# Patient Record
Sex: MALE | Race: WHITE | NOT HISPANIC OR LATINO
[De-identification: names, ages, dates, MRNs, and addresses within clinical notes are randomized per-mention and may not be internally consistent; named-entity substitution may affect disease eponyms.]

---

## 2021-08-12 ENCOUNTER — RESULT REVIEW (OUTPATIENT)
Age: 46
End: 2021-08-12

## 2021-09-07 ENCOUNTER — RESULT REVIEW (OUTPATIENT)
Age: 46
End: 2021-09-07

## 2021-09-09 PROBLEM — Z00.00 ENCOUNTER FOR PREVENTIVE HEALTH EXAMINATION: Status: ACTIVE | Noted: 2021-09-09

## 2021-09-10 ENCOUNTER — APPOINTMENT (OUTPATIENT)
Dept: PODIATRY | Facility: CLINIC | Age: 46
End: 2021-09-10
Payer: COMMERCIAL

## 2021-09-10 ENCOUNTER — NON-APPOINTMENT (OUTPATIENT)
Age: 46
End: 2021-09-10

## 2021-09-10 VITALS — WEIGHT: 185 LBS | HEIGHT: 71 IN | BODY MASS INDEX: 25.9 KG/M2

## 2021-09-10 DIAGNOSIS — Z78.9 OTHER SPECIFIED HEALTH STATUS: ICD-10-CM

## 2021-09-10 DIAGNOSIS — F17.200 NICOTINE DEPENDENCE, UNSPECIFIED, UNCOMPLICATED: ICD-10-CM

## 2021-09-10 DIAGNOSIS — L97.524 NON-PRESSURE CHRONIC ULCER OF OTHER PART OF LEFT FOOT WITH NECROSIS OF BONE: ICD-10-CM

## 2021-09-10 PROCEDURE — 20245 BONE BIOPSY OPEN DEEP: CPT

## 2021-09-10 PROCEDURE — 99214 OFFICE O/P EST MOD 30 MIN: CPT | Mod: 25

## 2021-09-10 NOTE — REVIEW OF SYSTEMS
[As Noted in HPI] : as noted in HPI [Fever] : no fever [Chills] : no chills [Leg Claudication] : no intermittent leg claudication [Lower Ext Edema] : no extremity edema [Limb Weakness] : no limb weakness [Difficulty Walking] : no difficulty walking

## 2021-09-10 NOTE — HISTORY OF PRESENT ILLNESS
[FreeTextEntry1] : Location-DFU sub 5th left foot\par Duration-many mos, complete hx from [employer], currently on topicals, still hasn't gotten offloading shoes or inneresoles\par \par

## 2021-09-10 NOTE — PROCEDURE
[FreeTextEntry1] : The patient was brought into the treatment room, a time out was performed, surgical consent signed, the surgical foot signed the surgical consent witnessed. Next, the left foot was prepped and draped in the usual sterile fashion. Next approximately 6 cc of 1% lidocaine with epinephrine one 200,000 was injected around the fifth metatarsal base for local anesthesia. Next utilizing a 3 mm punch biopsy it was introduced to the plantar aspect of the left foot just lateral to the existing ulcer bed through the subcutaneous tissue into the plantar aspect of the fifth metatarsal bone. Next utilizing the punch biopsy a small portion of bone was then obtained and removed. It was then placed in a sterile biopsy container and transported to the lab. The wound was irrigated with saline and a dry sterile bandage with Bactroban was applied. The patient was then placed in a surgical shoe with offloading dispersion pad and postop instructions and aftercare. He was given a followup appointment with infectious disease as well as myself to be followed at the wound healing [place]. He left the office in stable and satisfactory condition tolerating the procedure and anesthesia well.\par A lengthy discussion with the patient at this time regarding their current diagnosis, surgical status and prognosis. I advised the patient that if they continue to follow my postoperative instructions regarding limited progressive weight bearing and activity level as well as continued formal physical therapy as well as physical therapy at home coupled with continued ice, elevation and offloading the surgical site they will continue to progress as expected. If, however, the patient does not follow my instructions and exhibits medical non compliance they run the risk of a prolonged post op period to return to normal function re: shoegear and activity level as well relief of symptoms and surgical results will be less than expected.\par I have reviewed the care orders with the patient they understand and they also understands the long-term consequences of not following my wound care orders as well  precautions I have recommended  and shoe gear advice I have mentioned.\par greater than 30 minutes spent with patient\par

## 2021-09-10 NOTE — PHYSICAL EXAM
[General Appearance - Alert] : alert [General Appearance - In No Acute Distress] : in no acute distress [Pes Cavus] : pes cavus deformity [FreeTextEntry3] : arterial study done and no evidence of PAD, pedal pulses palpable [FreeTextEntry1] : Ulcer (dimensions unchanged from tissue analytics ([facility] 9/9/2021)\par patient here for bx sub 5 left

## 2021-09-15 LAB — CORE LAB BIOPSY: NORMAL

## 2021-09-23 ENCOUNTER — RESULT REVIEW (OUTPATIENT)
Age: 46
End: 2021-09-23

## 2021-09-30 ENCOUNTER — RESULT REVIEW (OUTPATIENT)
Age: 46
End: 2021-09-30

## 2021-10-12 ENCOUNTER — RESULT REVIEW (OUTPATIENT)
Age: 46
End: 2021-10-12

## 2021-10-13 ENCOUNTER — RESULT REVIEW (OUTPATIENT)
Age: 46
End: 2021-10-13

## 2021-10-17 ENCOUNTER — RESULT REVIEW (OUTPATIENT)
Age: 46
End: 2021-10-17

## 2021-12-29 ENCOUNTER — APPOINTMENT (OUTPATIENT)
Dept: PODIATRY | Facility: CLINIC | Age: 46
End: 2021-12-29
Payer: SELF-PAY

## 2021-12-29 ENCOUNTER — APPOINTMENT (OUTPATIENT)
Dept: PODIATRY | Facility: CLINIC | Age: 46
End: 2021-12-29
Payer: COMMERCIAL

## 2021-12-29 VITALS — WEIGHT: 185 LBS | BODY MASS INDEX: 25.9 KG/M2 | HEIGHT: 71 IN

## 2021-12-29 PROCEDURE — 99024 POSTOP FOLLOW-UP VISIT: CPT

## 2021-12-29 PROCEDURE — L3000: CPT

## 2021-12-29 NOTE — HISTORY OF PRESENT ILLNESS
[FreeTextEntry1] : patient is s/p left foot surgery. doing well and no complaints. denies fever, chills, N and V , calf pain and SOB\par s/p 5th ray resection left foot\par \par here to be casted for stump filler as well as for chronic great toe ulcer right GT

## 2021-12-29 NOTE — PHYSICAL EXAM
[General Appearance - Alert] : alert [General Appearance - In No Acute Distress] : in no acute distress [Vibration Dec.] : diminished vibratory sensation at the level of the toes [Position Sense Dec.] : diminished position sense at the level of the toes [Diminished Throughout Right Foot] : diminished sensation with monofilament testing throughout right foot [Diminished Throughout Left Foot] : diminished sensation with monofilament testing throughout left foot [FreeTextEntry3] : Vascular exam reveals palpable pedal pulses, the foot is warm to touch, there was good capillary fill time, the skin is normal in appearance there is no evidence of vascular disease or compromise at this time [de-identified] : s/p 5th ray resection [FreeTextEntry1] : chronic ulcer sub right GT-now limmited to HUSAM stage 1

## 2021-12-29 NOTE — PROCEDURE
[FreeTextEntry1] : During the evaluation and management I had a lengthy discussion with the patient regarding benefits of functional foot orthoses. I explained to the patient the etiology and treatment options and one of them included the offloading and balancing of the painful portion of the foot. I explained the importance of balancing in offloading the painful area as part of the overall treatment process to advance healing. I have asked the patient to consider this as part of the treatment\par \par After a lengthy discussion with the patient regarding the possible benefits of orthotics and what we hope to achieve with them as it relates to their diagnosis the patient has agreed to be casted for the devices, The patient was then casted for a pair of custom functional foot orthoses with the subtalar joint in neutral, the forefoot locked, The patient was advised that they will be notified when the orthotics are returned from the laboratory, Should there be any questions or concerns they were advised to contact the office immediately, Educational literature regarding orthotics were dispensed, Included in the casting for orthotics was an overall gait exam and biomechanical evaluation\par

## 2022-01-24 ENCOUNTER — APPOINTMENT (OUTPATIENT)
Dept: PODIATRY | Facility: CLINIC | Age: 47
End: 2022-01-24
Payer: COMMERCIAL

## 2022-01-24 VITALS — BODY MASS INDEX: 25.9 KG/M2 | HEIGHT: 71 IN | WEIGHT: 185 LBS

## 2022-01-24 DIAGNOSIS — L97.514 NON-PRESSURE CHRONIC ULCER OF OTHER PART OF RIGHT FOOT WITH NECROSIS OF BONE: ICD-10-CM

## 2022-01-24 DIAGNOSIS — L02.611 CUTANEOUS ABSCESS OF RIGHT FOOT: ICD-10-CM

## 2022-01-24 PROCEDURE — 11044 DBRDMT BONE 1ST 20 SQ CM/<: CPT

## 2022-01-24 PROCEDURE — 20245 BONE BIOPSY OPEN DEEP: CPT | Mod: 59

## 2022-01-24 PROCEDURE — 99214 OFFICE O/P EST MOD 30 MIN: CPT | Mod: 25
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
## 2022-01-24 NOTE — PROCEDURE
[FreeTextEntry1] : Right GT Abscess\par after a lengthy discussion with the patient regarding the need to drain the abscess and perform and extensive debridement and after receiving verbal consent in front of my medical assistant the area was cleansed with alcohol, painted with Betadine and copious amounts of topical as well as viscous lidocaine was applied to the area. Once sufficient anesthesia was achieved and utilizing a sterile 15 blade an incision and drainage was accomplished through the abscess.. Next using a tissue nipper, a sterile 10 and 15 blade the abscess was completely opened and drained. Next a full-thickness excisional debridement was performed and this was then noted to be down to the distal phalanx of the right great toe.  Next utilizing a sterile bone nipper a portion of bone was sent for biopsy to check for plasma cells, as well as a portion of bone taken for anaerobic Gram stain culture and sensitivity. The area was then irrigated with saline further inspection revealed no necrotic tissue however given the appearance of the bone which was brownish, I suggested hospitalization but the patient refused stating it was his son's birthday. A final irrigation was performed iodoform packing was placed into the wound, bactroban placed to the small ulcer to the medial side of the right second digit and a dry sterile bandage was applied. I immediately notified infectious disease and Dr. Hebert suggested Bactroban DS he started immediately and he agreed to see him tomorrow at the wound healing [place].\par Discharge instructions as well as wound care instructions were reviewed with the patient and they understand and was given a follow-up appointment. All questions asked and answered appropriately.  Patient left the office in improved and satisfactory condition and vital signs stable.\par greater than 45 minutes spent with patient\par \par \par

## 2022-01-24 NOTE — REASON FOR VISIT
[Follow-Up Visit] : a follow-up visit for [FreeTextEntry2] : left foot surgery-5th ray resection 10/18/2021 and new c/o drainage from right great toe over the weekend. recently returned to work
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
## 2022-01-24 NOTE — REVIEW OF SYSTEMS
[As Noted in HPI] : as noted in HPI [Skin Lesions] : skin lesion [Skin Wound] : skin wound [Fever] : no fever [Chills] : no chills [Leg Claudication] : no intermittent leg claudication [Lower Ext Edema] : no extremity edema [FreeTextEntry2] : only stated that happened once over the weekend [de-identified] : right, scar left lateral

## 2022-01-26 ENCOUNTER — RESULT REVIEW (OUTPATIENT)
Age: 47
End: 2022-01-26

## 2022-02-01 ENCOUNTER — APPOINTMENT (OUTPATIENT)
Dept: PODIATRY | Facility: CLINIC | Age: 47
End: 2022-02-01
Payer: COMMERCIAL

## 2022-02-01 VITALS — WEIGHT: 185 LBS | BODY MASS INDEX: 25.9 KG/M2 | HEIGHT: 71 IN

## 2022-02-01 LAB
BACTERIA TISS CULT: ABNORMAL
CORE LAB BIOPSY: NORMAL

## 2022-02-01 PROCEDURE — 99024 POSTOP FOLLOW-UP VISIT: CPT

## 2022-02-01 NOTE — REVIEW OF SYSTEMS
[Fever] : no fever [Chills] : no chills [Leg Claudication] : no intermittent leg claudication [Lower Ext Edema] : no extremity edema [Skin Lesions] : no skin lesions [Skin Wound] : no skin wound

## 2022-02-01 NOTE — HISTORY OF PRESENT ILLNESS
[FreeTextEntry1] : patient is s/p right foot surgery. doing well and no complaints. denies fever, chills, N and V , calf pain and SOB\par s/p amp w/ flap right great toe1/27/22

## 2022-02-01 NOTE — PROCEDURE
[FreeTextEntry1] : A dry sterile dressing was applied and patient was advised to stay in the appropriate shoe gear and offloading until further notice.\par A lengthy discussion with the patient at this time regarding their current diagnosis, surgical status and prognosis. I advised the patient that if they continue to follow my postoperative instructions regarding limited progressive weight bearing and activity level as well as continued formal physical therapy as well as physical therapy at home coupled with continued ice, elevation and offloading the surgical site they will continue to progress as expected. If, however, the patient does not follow my instructions and exhibits medical non compliance they run the risk of a prolonged post op period to return to normal function re: shoegear and activity level as well relief of symptoms and surgical results will be less than expected.\par \par asked to bring orthotics in for stump filler\par follow up appt 1 week\par

## 2022-02-01 NOTE — PHYSICAL EXAM
[FreeTextEntry3] : Vascular exam reveals palpable pedal pulses, the foot is warm to touch, there was good capillary fill time, the skin is normal in appearance there is no evidence of vascular disease or compromise at this time [FreeTextEntry1] : no cellulitis, no tunneling, no sinus tract, no drainage, no odor, mild periwound erythema, no evidence of infection\par

## 2022-02-04 ENCOUNTER — APPOINTMENT (OUTPATIENT)
Dept: PODIATRY | Facility: CLINIC | Age: 47
End: 2022-02-04
Payer: COMMERCIAL

## 2022-02-04 VITALS — BODY MASS INDEX: 25.9 KG/M2 | WEIGHT: 185 LBS | HEIGHT: 71 IN

## 2022-02-04 PROCEDURE — 73630 X-RAY EXAM OF FOOT: CPT | Mod: RT

## 2022-02-04 PROCEDURE — 99024 POSTOP FOLLOW-UP VISIT: CPT

## 2022-02-04 NOTE — PROCEDURE
[FreeTextEntry1] : X-rays were taken in the office multiple views right foot\par X-rays were taken in the office multiple views of the left foot\par post op changes noted and reviewed with the patient. pre and post op films compared, surgical resection highlighted and correction noted and appreciated by patient.\par post op changes noted and reviewed with the patient. pre and post op films compared, surgical resection highlighted and correction noted and appreciated by patient.\par I advised the patient I would take the orthotic and has discussed would make the appropriate repair and notify them when they returned from the laboratory\par \par A dry sterile dressing was applied and patient was advised to stay in the appropriate shoe gear and offloading until further notice.\par A lengthy discussion with the patient at this time regarding their current diagnosis, surgical status and prognosis. I advised the patient that if they continue to follow my postoperative instructions regarding limited progressive weight bearing and activity level as well as continued formal physical therapy as well as physical therapy at home coupled with continued ice, elevation and offloading the surgical site they will continue to progress as expected. If, however, the patient does not follow my instructions and exhibits medical non compliance they run the risk of a prolonged post op period to return to normal function re: shoegear and activity level as well relief of symptoms and surgical results will be less than expected.\par \par asked to bring orthotics in for stump filler\par follow up appt 1 week\par

## 2022-02-04 NOTE — PHYSICAL EXAM
[FreeTextEntry3] : Vascular exam reveals palpable pedal pulses, the foot is warm to touch, there was good capillary fill time, the skin is normal in appearance there is no evidence of vascular disease or compromise at this time [FreeTextEntry1] : no cellulitis, no tunneling, no sinus tract, no drainage, no odor, mild periwound erythema, no evidence of infection, however small area of necrotic tissue laterally.\par

## 2022-02-08 ENCOUNTER — APPOINTMENT (OUTPATIENT)
Dept: PODIATRY | Facility: CLINIC | Age: 47
End: 2022-02-08
Payer: COMMERCIAL

## 2022-02-08 VITALS — BODY MASS INDEX: 25.9 KG/M2 | HEIGHT: 71 IN | WEIGHT: 185 LBS

## 2022-02-08 PROCEDURE — 99024 POSTOP FOLLOW-UP VISIT: CPT

## 2022-02-08 NOTE — REVIEW OF SYSTEMS
[Fever] : no fever [Chills] : no chills [Leg Claudication] : no intermittent leg claudication [Lower Ext Edema] : no extremity edema [Skin Lesions] : no skin lesions [Skin Wound] : no skin wound 22

## 2022-02-08 NOTE — PROCEDURE
[FreeTextEntry1] : Saw ID earlier, continue meds\par \par A dry sterile dressing was applied and patient was advised to stay in the appropriate shoe gear and offloading until further notice.\par A lengthy discussion with the patient at this time regarding their current diagnosis, surgical status and prognosis. I advised the patient that if they continue to follow my postoperative instructions regarding limited progressive weight bearing and activity level as well as continued formal physical therapy as well as physical therapy at home coupled with continued ice, elevation and offloading the surgical site they will continue to progress as expected. If, however, the patient does not follow my instructions and exhibits medical non compliance they run the risk of a prolonged post op period to return to normal function re: shoegear and activity level as well relief of symptoms and surgical results will be less than expected.\par \par \par Must do dressing changes q2days w/ bacroban to suture line\par follow up appt 1 week\par

## 2022-02-08 NOTE — HISTORY OF PRESENT ILLNESS
[FreeTextEntry1] : patient is s/p right foot surgery. doing well and no complaints. denies fever, chills, N and V , calf pain and SOB\par s/p amp w/ flap right great toe1/27/22\par Did not do dressing changes at home as requested

## 2022-02-08 NOTE — PHYSICAL EXAM
[FreeTextEntry3] : Vascular exam reveals palpable pedal pulses, the foot is warm to touch, there was good capillary fill time, the skin is normal in appearance there is no evidence of vascular disease or compromise at this time [FreeTextEntry1] : no cellulitis, no tunneling, no sinus tract, no drainage, no odor, mild periwound erythema, no evidence of infection. small area of lateral neccrosis was just dried blood which is no longer evident\par

## 2022-02-15 ENCOUNTER — APPOINTMENT (OUTPATIENT)
Dept: PODIATRY | Facility: CLINIC | Age: 47
End: 2022-02-15
Payer: COMMERCIAL

## 2022-02-15 VITALS — HEIGHT: 71 IN | WEIGHT: 185 LBS | BODY MASS INDEX: 25.9 KG/M2

## 2022-02-15 PROCEDURE — 99024 POSTOP FOLLOW-UP VISIT: CPT

## 2022-02-15 NOTE — PROCEDURE
[FreeTextEntry1] : Saw ID earlier  spoke w/ RN non compliance w/ tx relayed from [facility]\par Under sterile and aseptic technique sutures were removed. Mild dehisence of the flap noted laterally. Bactroban applied to suture line and to be done daily.  Discharge instructions revealed maintaining the proper postoperative shoe gear and advice stressed\par A lengthy discussion with the patient at this time regarding their current diagnosis, surgical status and prognosis. I advised the patient that if they continue to follow my postoperative instructions regarding limited progressive weight bearing and activity level as well as continued formal physical therapy as well as physical therapy at home coupled with continued ice, elevation and offloading the surgical site they will continue to progress as expected. If, however, the patient does not follow my instructions and exhibits medical non compliance they run the risk of a prolonged post op period to return to normal function re: shoegear and activity level as well relief of symptoms and surgical results will be less than expected.\par \par \par Must do dressing changes every day w/ bacroban to suture line\par follow up appt 1 week\par

## 2022-02-24 ENCOUNTER — APPOINTMENT (OUTPATIENT)
Dept: PODIATRY | Facility: CLINIC | Age: 47
End: 2022-02-24
Payer: COMMERCIAL

## 2022-02-24 VITALS — BODY MASS INDEX: 25.9 KG/M2 | HEIGHT: 71 IN | WEIGHT: 185 LBS

## 2022-02-24 DIAGNOSIS — T81.30XA DISRUPTION OF WOUND, UNSPECIFIED, INITIAL ENCOUNTER: ICD-10-CM

## 2022-02-24 PROCEDURE — 99024 POSTOP FOLLOW-UP VISIT: CPT

## 2022-02-24 NOTE — PHYSICAL EXAM
[FreeTextEntry3] : Vascular exam reveals palpable pedal pulses, the foot is warm to touch, there was good capillary fill time, the skin is normal in appearance there is no evidence of vascular disease or compromise at this time [FreeTextEntry1] : ID has discharged patient, states no longer any need for Abx. No longer any area of necrosis but small area of lateral dehiscence of about 10% of lateral flap.  Remainder of incision healing uneventfully\par

## 2022-02-24 NOTE — PROCEDURE
[FreeTextEntry1] : Bactroban applied to suture line and to be done daily.  Discharge instructions revealed maintaining the proper postoperative shoe gear and advice stressed\par Must continue DD changes w/ bactroban \par May not RTW until entire incision has healed\par Orthotic Evaluation the orthotics have been evaluated and I do feel that there is a good fit to the patient's foot and they have been made to my specifications. \par          Clinical Notes: The patient presents for dispensing of custom molded foot orthotics. I have removed the orthotics from the packaging and I have examined him. They do appear to be made as per my instructions regarding materials additions corrections. Upon placing him to the patient's foot they do appear to fit nicely. There is no material failure nor gapping. They were then trimmed to fit and placed inside the patient's shoe gear. There appears to be a good fit. Upon initial ambulation the patient has no initial complaints regarding pain off edges were tight fit. The patient did ambulate around the office for several minutes and had a favorable result. I then explained to the patient the normal break-in period. The paperwork supplied by the laboratory was reviewed with the patient. They understand a normal break-in period is to be gradual over several weeks. I've advised the patient that different, weird, and uncomfortable are certainly acceptable words in the beginning however pain, blister and callus are things that should not occur. Once the proper break-in was explained to the patient they were given an appointment to follow up.\par \par

## 2022-02-24 NOTE — HISTORY OF PRESENT ILLNESS
[FreeTextEntry1] : patient is s/p right foot surgery. doing well and no complaints. denies fever, chills, N and V , calf pain and SOB\par s/p amp w/ flap right great toe1/27/22\par The patient presents today for evaluation, fitting and dispensing of custom made foot orthotics this time w/ right GT stump filler\par

## 2022-03-03 ENCOUNTER — APPOINTMENT (OUTPATIENT)
Dept: PODIATRY | Facility: CLINIC | Age: 47
End: 2022-03-03
Payer: COMMERCIAL

## 2022-03-03 VITALS — WEIGHT: 185 LBS | BODY MASS INDEX: 25.9 KG/M2 | HEIGHT: 71 IN

## 2022-03-03 PROCEDURE — 99024 POSTOP FOLLOW-UP VISIT: CPT

## 2022-03-03 NOTE — HISTORY OF PRESENT ILLNESS
[FreeTextEntry1] : patient is s/p right foot surgery. doing well and no complaints. denies fever, chills, N and V , calf pain and SOB\par s/p amp w/ flap right great toe1/27/22\par \par Patient never did any dressing changes during the week as instructed\par

## 2022-03-03 NOTE — PHYSICAL EXAM
[FreeTextEntry3] : Vascular exam reveals palpable pedal pulses, the foot is warm to touch, there was good capillary fill time, the skin is normal in appearance there is no evidence of vascular disease or compromise at this time [FreeTextEntry1] : ID has discharged patient, states no longer any need for Abx. Lateral part of incision now granulationg in 100% GT.\par no cellulitis, no tunneling, no sinus tract, no drainage, no odor, mild periwound erythema, no evidence of infection\par \par

## 2022-03-03 NOTE — PROCEDURE
[FreeTextEntry1] : Bactroban applied to suture line and to be done daily. Betadine to remaining 90%. Discharge instructions revealed maintaining the proper postoperative shoe gear and advice stressed\par May not RTW until entire incision has healed\par follow up appt 1 week\par

## 2022-03-10 ENCOUNTER — APPOINTMENT (OUTPATIENT)
Dept: PODIATRY | Facility: CLINIC | Age: 47
End: 2022-03-10

## 2022-03-18 ENCOUNTER — APPOINTMENT (OUTPATIENT)
Dept: PODIATRY | Facility: CLINIC | Age: 47
End: 2022-03-18
Payer: COMMERCIAL

## 2022-03-18 VITALS — HEIGHT: 71 IN | WEIGHT: 185 LBS | BODY MASS INDEX: 25.9 KG/M2

## 2022-03-18 PROCEDURE — 99024 POSTOP FOLLOW-UP VISIT: CPT

## 2022-03-18 NOTE — HISTORY OF PRESENT ILLNESS
[FreeTextEntry1] : patient is s/p right foot surgery. doing well and no complaints. denies fever, chills, N and V , calf pain and SOB\par s/p amp w/ flap right great toe1/27/22\par Mild lateral dehisence\par

## 2022-03-18 NOTE — PROCEDURE
[FreeTextEntry1] : a sharp full thickness excisional debridement utilizing a scalpel tissue nipper as well as a curette into the subcutaneous tissue level was performed. Bleeding was mild and was controlled with pressure. , discharge orders as well as wound care orders were reviewed with the patient and a follow up appointment given, Bactroban to be applied on a daily basis\par \par

## 2022-03-18 NOTE — PHYSICAL EXAM
[FreeTextEntry3] : Vascular exam reveals palpable pedal pulses, the foot is warm to touch, there was good capillary fill time, the skin is normal in appearance there is no evidence of vascular disease or compromise at this time [FreeTextEntry1] : small lateral dehiscence , no cellulitis, no tunneling, no sinus tract, no drainage, no odor, mild periwound erythema, no evidence of infection\par

## 2022-03-28 ENCOUNTER — APPOINTMENT (OUTPATIENT)
Dept: PODIATRY | Facility: CLINIC | Age: 47
End: 2022-03-28
Payer: COMMERCIAL

## 2022-03-28 VITALS — BODY MASS INDEX: 25.9 KG/M2 | WEIGHT: 185 LBS | HEIGHT: 71 IN

## 2022-03-28 DIAGNOSIS — M86.172 OTHER ACUTE OSTEOMYELITIS, LEFT ANKLE AND FOOT: ICD-10-CM

## 2022-03-28 DIAGNOSIS — M86.171 OTHER ACUTE OSTEOMYELITIS, RIGHT ANKLE AND FOOT: ICD-10-CM

## 2022-03-28 PROCEDURE — 73630 X-RAY EXAM OF FOOT: CPT | Mod: RT

## 2022-03-28 PROCEDURE — 99024 POSTOP FOLLOW-UP VISIT: CPT

## 2022-03-28 NOTE — HISTORY OF PRESENT ILLNESS
[FreeTextEntry1] : patient is s/p right foot surgery. doing well and no complaints. denies fever, chills, N and V , calf pain and SOB\par s/p amp w/ flap right great toe1/27/22\par Mild lateral dehisence\par Also c/o some residual pain to the left 5th ray amputation of the left lateral foot\par

## 2022-03-28 NOTE — REVIEW OF SYSTEMS
[Dry Skin] : dry skin [As Noted in HPI] : as noted in HPI [Fever] : no fever [Chills] : no chills [Leg Claudication] : no intermittent leg claudication [Lower Ext Edema] : no extremity edema [Limb Pain] : no limb pain [Limb Swelling] : no limb swelling [Skin Lesions] : no skin lesions [Skin Wound] : no skin wound

## 2022-03-28 NOTE — PROCEDURE
[FreeTextEntry1] : X-rays were taken in the office multiple views right foot\par post op changes noted and reviewed with the patient. pre and post op films compared, surgical resection highlighted and correction noted by patient.\par \par A complete and thorough evaluation of the type of shoes they should be wearing and type of shoes for this time of year was discussed with patient.\par \par I have had a lengthy discussion with the patient regarding overall skincare. The importance of the type of socks, the type of shoes, and the type of overall foot hygiene is important to help control and prevent eruptions especially over extreme weather changes. This included but was not limited to hydration and lubrication, dove soap, triple rinse clothing and linens. I also explained the importance of thorough drying of both feet especially the web spaces. Given the extreme temperatures back in a car I also reviewed the type of shoes that would help reduce the chances of cracking of the skin especially leading to fissuring of the heels. Overall skincare precautions were reviewed education literature dispensed in the patient's questions asked and answered appropriately.\par \par I have reviewed the care orders with the patient they understand and they also understands the long-term consequences of not following my wound care orders as well  precautions I have recommended  and shoe gear advice I have mentioned.\par \par Betadine to right great toe incision\par \par Continue orthltoic management\par Limited WB\par \par follow up appt 2 weeks\par

## 2022-03-28 NOTE — PHYSICAL EXAM
[FreeTextEntry3] : Vascular exam reveals palpable pedal pulses, the foot is warm to touch, there was good capillary fill time, the skin is normal in appearance there is no evidence of vascular disease or compromise at this time [de-identified] : left lateral partial foot amputation-still remodelling but no acute pathology noted [FreeTextEntry1] : incision right great toe still not healed, eschar w/ mild fluctuance laterally\par no cellultis no erythema

## 2022-04-11 ENCOUNTER — APPOINTMENT (OUTPATIENT)
Dept: PODIATRY | Facility: CLINIC | Age: 47
End: 2022-04-11
Payer: COMMERCIAL

## 2022-04-11 VITALS — HEIGHT: 71 IN | BODY MASS INDEX: 25.9 KG/M2 | WEIGHT: 185 LBS

## 2022-04-11 PROCEDURE — 99024 POSTOP FOLLOW-UP VISIT: CPT

## 2022-04-11 NOTE — PROCEDURE
[FreeTextEntry1] : I have had a lengthy discussion with the patient regarding overall skincare. The importance of the type of socks, the type of shoes, and the type of overall foot hygiene is important to help control and prevent eruptions especially over extreme weather changes. This included but was not limited to hydration and lubrication, dove soap, triple rinse clothing and linens. I also explained the importance of thorough drying of both feet especially the web spaces. Given the extreme temperatures back in a car I also reviewed the type of shoes that would help reduce the chances of cracking of the skin especially leading to fissuring of the heels. Overall skincare precautions were reviewed education literature dispensed in the patient's questions asked and answered appropriately.\par A complete and thorough evaluation of the type of shoes they should be wearing and type of shoes for this time of year was discussed with patient.\par A lengthy discussion with the patient regarding diabetes and the manifestations second occur in the feet. The discussion included but was not limited to nail care, treatment of open wound, treatment of calluses, shoe gear, as well as statistics regarding diabetes as it relates to loss of toe, toes, midfoot, as well as mortality Re: diabetics wind up with a midfoot amputation, transmetatarsal amputation, as well as 50 percent of diabetics who suffer loss of the leg suffering mortality within 5 years. Educational literature was dispensed. Overall diabetic education as it regard to the foot was discussed with all questions asked and answered appropriately. I've advised the patient should there be any concerns regarding nails, infection, open wounds, interdigital problems, or acute injury that they should contact the office immediately.\par follow up appt 4 weeks\par

## 2022-04-11 NOTE — HISTORY OF PRESENT ILLNESS
[FreeTextEntry1] : patient is s/p right foot surgery. doing well and no complaints. denies fever, chills, N and V , calf pain and SOB\par s/p amp w/ flap right great toe1/27/22\par

## 2022-04-11 NOTE — PHYSICAL EXAM
[Skin Color & Pigmentation] : normal skin color and pigmentation [Skin Turgor] : normal skin turgor [] : no rash [Skin Lesions] : no skin lesions [FreeTextEntry3] : Vascular exam reveals palpable pedal pulses, the foot is warm to touch, there was good capillary fill time, the skin is normal in appearance there is no evidence of vascular disease or compromise at this time [de-identified] : left lateral partial foot amputation-still remodelling but no acute pathology noted [Foot Ulcer] : no foot ulcer [Skin Induration] : no skin induration

## 2022-05-12 ENCOUNTER — APPOINTMENT (OUTPATIENT)
Dept: PODIATRY | Facility: CLINIC | Age: 47
End: 2022-05-12
Payer: COMMERCIAL

## 2022-05-12 VITALS — HEIGHT: 71 IN | BODY MASS INDEX: 25.9 KG/M2 | WEIGHT: 185 LBS

## 2022-05-12 PROCEDURE — 99213 OFFICE O/P EST LOW 20 MIN: CPT

## 2022-05-12 NOTE — PROCEDURE
[FreeTextEntry1] : I have advised the patient that they may return to normal activity level but to limit it to tolerance. I advised them at this time that they do not need any assistive device special shoe bandaging or bracing. I also advised that should they be doing her normal daily function and they should experience some pain that upon finishing that they should return to anti-inflammatory medication over-the-counter if possible, ice and elevation. If this continues more than 24-48 hours he should contact the office immediately for followup appointment\par \par A complete and thorough evaluation of the type of shoes they should be wearing and type of shoes for this time of year was discussed with patient.\par follow up appt 4 weeks\par

## 2022-05-12 NOTE — PHYSICAL EXAM
[Skin Color & Pigmentation] : normal skin color and pigmentation [Skin Turgor] : normal skin turgor [] : no rash [Skin Lesions] : no skin lesions [FreeTextEntry3] : Vascular exam reveals palpable pedal pulses, the foot is warm to touch, there was good capillary fill time, the skin is normal in appearance there is no evidence of vascular disease or compromise at this time [de-identified] : left lateral partial foot amputation-still remodelling but no acute pathology noted [Foot Ulcer] : no foot ulcer [Skin Induration] : no skin induration [FreeTextEntry1] : complete healing

## 2022-06-14 ENCOUNTER — APPOINTMENT (OUTPATIENT)
Dept: PODIATRY | Facility: CLINIC | Age: 47
End: 2022-06-14

## 2022-06-14 VITALS — HEIGHT: 71 IN | WEIGHT: 185 LBS | BODY MASS INDEX: 25.9 KG/M2

## 2022-06-14 PROCEDURE — 11055 PARING/CUTG B9 HYPRKER LES 1: CPT | Mod: 59

## 2022-06-14 PROCEDURE — 11721 DEBRIDE NAIL 6 OR MORE: CPT | Mod: Q8,RT

## 2022-06-14 NOTE — REVIEW OF SYSTEMS
[Skin Lesions] : skin lesion [As Noted in HPI] : as noted in HPI [Fever] : no fever [Chills] : no chills [Leg Claudication] : no intermittent leg claudication [Lower Ext Edema] : no extremity edema [Limb Pain] : no limb pain [Limb Swelling] : no limb swelling [Skin Wound] : no skin wound

## 2022-06-14 NOTE — HISTORY OF PRESENT ILLNESS
Quality 226: Preventive Care And Screening: Tobacco Use: Screening And Cessation Intervention: Tobacco Screening not Performed for Medical Reasons [FreeTextEntry1] : s/p left 5th ray resection and right great toe amp\par doing well but c/o so,me pulling of right Gt and callus under left GT\par  Quality 130: Documentation Of Current Medications In The Medical Record: Current Medications Documented Detail Level: Detailed Quality 110: Preventive Care And Screening: Influenza Immunization: Influenza Immunization Administered during Influenza season Quality 431: Preventive Care And Screening: Unhealthy Alcohol Use - Screening: Patient screened for unhealthy alcohol use using a single question and scores less than 2 times per year

## 2022-06-14 NOTE — PROCEDURE
[FreeTextEntry1] : A lengthy discussion with the patient regarding diabetes and the manifestations second occur in the feet. The discussion included but was not limited to nail care, treatment of open wound, treatment of calluses, shoe gear, as well as statistics regarding diabetes as it relates to loss of toe, toes, midfoot, as well as mortality Re: diabetics wind up with a midfoot amputation, transmetatarsal amputation, as well as 50 percent of diabetics who suffer loss of the leg suffering mortality within 5 years. Educational literature was dispensed. Overall diabetic education as it regard to the foot was discussed with all questions asked and answered appropriately. I've advised the patient should there be any concerns regarding nails, infection, open wounds, interdigital problems, or acute injury that they should contact the office immediately.\par Using sterile instrumentation debridement of all nails manually and electrically to decrease thickness, pain and girth and make shoe gear more comfortable with "slant back" procedure of any bordering spicules causing pain\par \par Shaving and padding of a benign hyperkeratotic lesion\par

## 2022-06-14 NOTE — PHYSICAL EXAM
[Skin Color & Pigmentation] : normal skin color and pigmentation [Skin Turgor] : normal skin turgor [] : no rash [FreeTextEntry3] : Vascular exam reveals palpable pedal pulses, the foot is warm to touch, there was good capillary fill time, the skin is normal in appearance there is no evidence of vascular disease or compromise at this time [de-identified] : overall muscle strength testing is normal, ROM to ankle, mid tarsal, MTP joints all WNL and w/out crepitus or jamming. No atrophy and overall weakness noted.\par no pathology with amp site\par  [Foot Ulcer] : no foot ulcer [Skin Induration] : no skin induration [FreeTextEntry1] : Painful hyperkeratotic lesions noted sub 1 left\par no cellulitis, no tunneling, no sinus tract, no drainage, no odor, mild periwound erythema, no evidence of infection to either foot\par The patient has all contributing factors to onychomycosis including but not limited to thickness, subungual debris, discoloration and partial lysis and they are brittle when cut.\par \par

## 2022-06-30 ENCOUNTER — APPOINTMENT (OUTPATIENT)
Dept: PODIATRY | Facility: CLINIC | Age: 47
End: 2022-06-30

## 2022-06-30 VITALS — BODY MASS INDEX: 26.6 KG/M2 | WEIGHT: 190 LBS | HEIGHT: 71 IN

## 2022-06-30 PROCEDURE — 11042 DBRDMT SUBQ TIS 1ST 20SQCM/<: CPT

## 2022-06-30 PROCEDURE — 99213 OFFICE O/P EST LOW 20 MIN: CPT | Mod: 25

## 2022-06-30 NOTE — PROCEDURE
[FreeTextEntry1] : a sharp full thickness excisional debridement utilizing a scalpel tissue nipper as well as a curette into the subcutaneous tissue level was performed. Bleeding was mild and was controlled with pressure. , discharge orders as well as wound care orders were reviewed with the patient and a follow up appointment given, Bactroban to be applied on a daily basis\par Cx obtained\par A lengthy discussion with the patient regarding the current medication being prescribed, the dosage, frequency, and the need to be consistent with taking the medication at or about the same time every day. I also spent time reviewing the risks alternatives and benefits to the medication as well as the potential side effects. The decision was made to proceed with the medication\par iodosorb qam, bactroban qpm\par follow up appt 1 week\par

## 2022-06-30 NOTE — HISTORY OF PRESENT ILLNESS
[FreeTextEntry1] : Location-left hallux plantarly\par Duration-a few days---presents as emergeny\par Past tx- self tx\par

## 2022-06-30 NOTE — REVIEW OF SYSTEMS
[Skin Wound] : skin wound [Fever] : no fever [Chills] : no chills [Leg Claudication] : no intermittent leg claudication [Lower Ext Edema] : no extremity edema [Limb Pain] : no limb pain [Limb Swelling] : no limb swelling [FreeTextEntry9] : h [de-identified] : hx of self tx wound in that area which left him w/ deformity of toe after it healed (needed HBO for ultimate healing)

## 2022-06-30 NOTE — PHYSICAL EXAM
[FreeTextEntry3] : Vascular exam reveals palpable pedal pulses, the foot is warm to touch, there was good capillary fill time, the skin is normal in appearance there is no evidence of vascular disease or compromise at this time [de-identified] : no pathology with either foot at either amp site\par  [FreeTextEntry1] : draining ulcer at IPJ of right great toe--no robin pus-looks like hematoma with ome deep tissue-no foul odor, no cellulitis, denies fever chills N and V

## 2022-06-30 NOTE — REASON FOR VISIT
[Follow-Up Visit] : a follow-up visit for [Foot/Ankle Ulcer] : foot/ankle ulcer [FreeTextEntry2] : LEFT

## 2022-07-06 LAB — BACTERIA TISS CULT: ABNORMAL

## 2022-07-07 ENCOUNTER — APPOINTMENT (OUTPATIENT)
Dept: PODIATRY | Facility: CLINIC | Age: 47
End: 2022-07-07

## 2022-07-07 VITALS — WEIGHT: 190 LBS | BODY MASS INDEX: 26.6 KG/M2 | HEIGHT: 71 IN

## 2022-07-07 PROCEDURE — 99213 OFFICE O/P EST LOW 20 MIN: CPT

## 2022-07-07 NOTE — REVIEW OF SYSTEMS
[Skin Wound] : skin wound [Fever] : no fever [Chills] : no chills [Leg Claudication] : no intermittent leg claudication [Lower Ext Edema] : no extremity edema [Limb Pain] : no limb pain [Limb Swelling] : no limb swelling [de-identified] : hx of self tx wound in that area which left him w/ deformity of toe after it healed (needed HBO for ultimate healing)

## 2022-07-07 NOTE — PHYSICAL EXAM
[FreeTextEntry3] : Vascular exam reveals palpable pedal pulses, the foot is warm to touch, there was good capillary fill time, the skin is normal in appearance there is no evidence of vascular disease or compromise at this time [de-identified] : no pathology with either foot at either amp site\par  [FreeTextEntry1] : improved superficial ulcer at IPJ of right great toe--no foul odor, no cellulitis, denies fever chills N and V

## 2022-07-07 NOTE — HISTORY OF PRESENT ILLNESS
[FreeTextEntry1] : Location-left hallux plantarly\par Duration- about 10 days\par Past tx- self tx, came here, debridement, cx, abx cream and oral abx\par States he never took the oral abx AMA\par

## 2022-07-07 NOTE — PROCEDURE
[FreeTextEntry1] : Utilizing a sterile 15 blade debridement of devitalized tissue was performed\par I have had a lengthy discussion with the patient regarding overall skincare. The importance of the type of socks, the type of shoes, and the type of overall foot hygiene is important to help control and prevent eruptions especially over extreme weather changes. This included but was not limited to hydration and lubrication, dove soap, triple rinse clothing and linens. I also explained the importance of thorough drying of both feet especially the web spaces. Given the extreme temperatures back in a car I also reviewed the type of shoes that would help reduce the chances of cracking of the skin especially leading to fissuring of the heels. Overall skincare precautions were reviewed education literature dispensed in the patient's questions asked and answered appropriately.\par A complete and thorough evaluation of the type of shoes they should be wearing and type of shoes for this time of year was discussed with patient.\par \par I have advised the patient that they may return to normal activity level but to limit it to tolerance. I advised them at this time that they do not need any assistive device special shoe bandaging or bracing. I also advised that should they be doing her normal daily function and they should experience some pain that upon finishing that they should return to anti-inflammatory medication over-the-counter if possible, ice and elevation. If this continues more than 24-48 hours he should contact the office immediately for followup appointment\par follow up appt 3 weeks\par

## 2022-07-12 ENCOUNTER — APPOINTMENT (OUTPATIENT)
Dept: PODIATRY | Facility: CLINIC | Age: 47
End: 2022-07-12

## 2022-07-28 ENCOUNTER — APPOINTMENT (OUTPATIENT)
Dept: PODIATRY | Facility: CLINIC | Age: 47
End: 2022-07-28

## 2022-07-28 VITALS — BODY MASS INDEX: 26.6 KG/M2 | HEIGHT: 71 IN | WEIGHT: 190 LBS

## 2022-07-28 DIAGNOSIS — L97.511 NON-PRESSURE CHRONIC ULCER OF OTHER PART OF RIGHT FOOT LIMITED TO BREAKDOWN OF SKIN: ICD-10-CM

## 2022-07-28 PROCEDURE — 11042 DBRDMT SUBQ TIS 1ST 20SQCM/<: CPT

## 2022-07-28 RX ORDER — CEPHALEXIN 500 MG/1
500 CAPSULE ORAL 4 TIMES DAILY
Qty: 28 | Refills: 1 | Status: DISCONTINUED | COMMUNITY
Start: 2022-06-30 | End: 2022-07-28

## 2022-07-28 NOTE — PHYSICAL EXAM
[FreeTextEntry3] : Vascular exam reveals palpable pedal pulses, the foot is warm to touch, there was good capillary fill time, the skin is normal in appearance there is no evidence of vascular disease or compromise at this time [de-identified] : no pathology with either foot at either amp site\par  [FreeTextEntry1] : recurrance of recent diabetic ulcer at IPJ of right great toe.\par dried blood, no fluctuance, no cellulitis, no drainage no pus

## 2022-07-28 NOTE — HISTORY OF PRESENT ILLNESS
[FreeTextEntry1] : Location-left hallux plantarly\par Duration-a few days\par Recuurrance of prior recent deep ulcer\par

## 2022-07-28 NOTE — PROCEDURE
[FreeTextEntry1] : a sharp full thickness excisional debridement utilizing a scalpel tissue nipper as well as a curette into the subcutaneous tissue level was performed. Bleeding was mild and was controlled with pressure. , discharge orders as well as wound care orders were reviewed with the patient and a follow up appointment given, Bactroban to be applied on a daily basis\par \par weight bearing x-rays ordered stat\par \par will review and discuss pro active tx options at next visit\par \par follow up appt 2 weeks\par

## 2022-07-28 NOTE — REVIEW OF SYSTEMS
[Skin Wound] : skin wound [Fever] : no fever [Chills] : no chills [Leg Claudication] : no intermittent leg claudication [Lower Ext Edema] : no extremity edema [Limb Pain] : no limb pain [Limb Swelling] : no limb swelling [de-identified] : hx of self tx wound in that area which left him w/ deformity of toe after it healed (needed HBO for ultimate healing)

## 2022-08-15 ENCOUNTER — APPOINTMENT (OUTPATIENT)
Dept: PODIATRY | Facility: CLINIC | Age: 47
End: 2022-08-15

## 2022-08-15 VITALS — HEIGHT: 71 IN | BODY MASS INDEX: 26.6 KG/M2 | WEIGHT: 190 LBS

## 2022-08-15 PROCEDURE — 99213 OFFICE O/P EST LOW 20 MIN: CPT

## 2022-08-15 RX ORDER — CEFADROXIL 1000 MG/1
1 TABLET ORAL
Qty: 28 | Refills: 0 | Status: DISCONTINUED | COMMUNITY
Start: 2022-01-28 | End: 2022-08-15

## 2022-08-15 RX ORDER — LINAGLIPTIN AND METFORMIN HYDROCHLORIDE 2.5; 1 MG/1; MG/1
2.5-1 TABLET, FILM COATED ORAL
Refills: 0 | Status: DISCONTINUED | COMMUNITY
End: 2022-08-15

## 2022-08-15 NOTE — PHYSICAL EXAM
[FreeTextEntry3] : Vascular exam reveals palpable pedal pulses, the foot is warm to touch, there was good capillary fill time, the skin is normal in appearance there is no evidence of vascular disease or compromise at this time [de-identified] : no pathology with either foot at either amp site\par  [FreeTextEntry1] : ulcer sub left GT is now stage 1 limited to breakdown of skin\par no cellulitis, no tunneling, no sinus tract, no drainage, no odor, mild periwound erythema, no evidence of infection\par

## 2022-08-15 NOTE — PROCEDURE
[FreeTextEntry1] : I have reviewed the care orders with the patient they understand and they also understands the long-term consequences of not following my wound care orders as well  precautions I have recommended  and shoe gear advice I have mentioned.\par betadine daily for 1 week\par I examined the orthotics:\par sub 1 left has no dispersion:\par will shaheed and add\par Stump filler right:\par it's broken\par stump filler left 5th:\par loose\par I advised the patient I would take the orthotic and has discussed would make the appropriate repair and notify them when they returned from the laboratory\par I have applied offloading pads to the patient's foot and have given them several samples to continue to use. I have also advised the patient that they can certainly purchase these from an outside vendor and if they are willing to do that and the name and number was supplied\par Severqal given to pad foot and sneaker innersole to offload foot until orthotics return from the lab\par

## 2022-08-15 NOTE — REASON FOR VISIT
[Follow-Up Visit] : a follow-up visit for [Foot/Ankle Ulcer] : foot/ankle ulcer [Foot Deformity] : foot deformity [FreeTextEntry2] : LEFT

## 2022-08-15 NOTE — REVIEW OF SYSTEMS
[Skin Wound] : skin wound [Fever] : no fever [Chills] : no chills [Leg Claudication] : no intermittent leg claudication [Lower Ext Edema] : no extremity edema [Limb Pain] : no limb pain [Limb Swelling] : no limb swelling [de-identified] : hx of self tx wound in that area which left him w/ deformity of toe after it healed (needed HBO for ultimate healing)

## 2022-08-15 NOTE — HISTORY OF PRESENT ILLNESS
[FreeTextEntry1] : s/p left 5th ray resection, s/p right great toe amp\par Recent left GT ulcer\par Location-left hallux plantarly--exacerbations and remissions-thinks it's doing better\par  Yes

## 2022-11-02 ENCOUNTER — APPOINTMENT (OUTPATIENT)
Dept: PODIATRY | Facility: CLINIC | Age: 47
End: 2022-11-02
Payer: MEDICAID

## 2022-11-02 ENCOUNTER — RESULT REVIEW (OUTPATIENT)
Age: 47
End: 2022-11-02

## 2022-11-02 VITALS — WEIGHT: 190 LBS | BODY MASS INDEX: 26.6 KG/M2 | HEIGHT: 71 IN

## 2022-11-02 PROCEDURE — 11042 DBRDMT SUBQ TIS 1ST 20SQCM/<: CPT

## 2022-11-02 PROCEDURE — 99213 OFFICE O/P EST LOW 20 MIN: CPT | Mod: 25

## 2022-11-02 PROCEDURE — 99072 ADDL SUPL MATRL&STAF TM PHE: CPT

## 2022-11-02 RX ORDER — METFORMIN HYDROCHLORIDE 1000 MG/1
1000 TABLET, COATED ORAL
Qty: 180 | Refills: 0 | Status: ACTIVE | COMMUNITY
Start: 2022-10-25

## 2022-11-02 RX ORDER — EMPAGLIFLOZIN, METFORMIN HYDROCHLORIDE 5; 1000 MG/1; MG/1
5-1000 TABLET, EXTENDED RELEASE ORAL
Qty: 60 | Refills: 0 | Status: DISCONTINUED | COMMUNITY
Start: 2021-09-02 | End: 2022-11-02

## 2022-11-02 NOTE — PHYSICAL EXAM
[Vibration Dec.] : diminished vibratory sensation at the level of the toes [Diminished Throughout Right Foot] : diminished sensation with monofilament testing throughout right foot [Diminished Throughout Left Foot] : diminished sensation with monofilament testing throughout left foot [Oriented To Time, Place, And Person] : oriented to person, place, and time [Affect] : the affect was normal [de-identified] : 5th ray resection left, right partial 2nd toe, right partial hallux amp [FreeTextEntry1] : Ulceration at the level of the IPJ of the left great toe. after debridement of HK border 1cm x 0.8 cm x 0.5 cm \par 100% fibrin. No odor, no pus, no exposed tendon or bone, no cellulitis.\par

## 2022-11-02 NOTE — PROCEDURE
[FreeTextEntry1] : had a lengthy discussion with the patient regarding the diagnosis etiology and differential diagnosis as well as treatment options for the presenting problem. Risks alternatives and benefits of treatment ranging from conservative to surgical explained in great detail. I also explained the progression of treatment from conservative to possible surgical treatment options as well as the benefits of each. I do stress conservative treatment if in fact conservative treatment is an option until it no longer provides relief. Over-the-counter products medications padding, and splinting were reviewed as well. All questions asked and answered appropriately to the patient's satisfaction\par \par a sharp full thickness excisional debridement utilizing a scalpel tissue nipper as well as a curette into the subcutaneous tissue level was performed. Bleeding was mild and was controlled with pressure. , discharge orders as well as wound care orders were reviewed with the patient and a follow up appointment given, Bactroban to be applied on a daily basis\par \par Cx obtained\par \par weight bearing x-rays ordered stat\par \par A lengthy discussion with the patient regarding the current medication being prescribed, the dosage, frequency, and the need to be consistent with taking the medication at or about the same time every day. I also spent time reviewing the risks alternatives and benefits to the medication as well as the potential side effects. The decision was made to proceed with the medication\par I have reviewed the care orders with the patient they understand and they also understands the long-term consequences of not following my wound care orders as well  precautions I have recommended  and shoe gear advice I have mentioned.\par \par follow up appt 1 week\par

## 2022-11-02 NOTE — REVIEW OF SYSTEMS
[As Noted in HPI] : as noted in HPI [Skin Lesions] : skin lesion [Skin Wound] : skin wound [Fever] : no fever [Chills] : no chills [Leg Claudication] : no intermittent leg claudication [Lower Ext Edema] : no extremity edema [FreeTextEntry9] : s/p multiple bilateral toe/ray amps 2/2 non healing DFU

## 2022-11-02 NOTE — HISTORY OF PRESENT ILLNESS
[FreeTextEntry1] : Location- left great toe\par Duration-about 2 months\par Past tx- betadine-self tx at home\par

## 2022-11-02 NOTE — REASON FOR VISIT
[Follow-Up Visit] : a follow-up visit for [Foot/Ankle Ulcer] : foot/ankle ulcer [FreeTextEntry2] : left foot

## 2022-11-08 LAB — BACTERIA TISS CULT: ABNORMAL

## 2022-11-09 ENCOUNTER — APPOINTMENT (OUTPATIENT)
Dept: PODIATRY | Facility: CLINIC | Age: 47
End: 2022-11-09
Payer: COMMERCIAL

## 2022-11-09 VITALS — HEIGHT: 71 IN | WEIGHT: 190 LBS | BODY MASS INDEX: 26.6 KG/M2

## 2022-11-09 PROCEDURE — 11042 DBRDMT SUBQ TIS 1ST 20SQCM/<: CPT | Mod: LT

## 2022-11-09 NOTE — HISTORY OF PRESENT ILLNESS
[FreeTextEntry1] : Location- left great toe\par Duration-about 2 months\par my recent treatment :\par debridement, wound cx, bactroban\par \par never changed my bandage from last week AMA--same bandage which is now drainage stained and dirty\par only p/u oral abx yesterday\par I explained non compliance has already cost him several body parts

## 2022-11-09 NOTE — PROCEDURE
[FreeTextEntry1] : had a lengthy discussion with the patient regarding the diagnosis etiology and differential diagnosis as well as treatment options for the presenting problem. Risks alternatives and benefits of treatment ranging from conservative to surgical explained in great detail. I also explained the progression of treatment from conservative to possible surgical treatment options as well as the benefits of each.\par A lengthy discussion with the patient at this time regarding their current diagnosis, status and prognosis. I advised the patient that if they follow my orders progress as expected. If, however, the patient does not follow my instructions and exhibits medical non compliance they run the risk of further loss of toe, foot, or leg

## 2022-11-09 NOTE — PHYSICAL EXAM
[Vibration Dec.] : diminished vibratory sensation at the level of the toes [Diminished Throughout Right Foot] : diminished sensation with monofilament testing throughout right foot [Diminished Throughout Left Foot] : diminished sensation with monofilament testing throughout left foot [Oriented To Time, Place, And Person] : oriented to person, place, and time [Affect] : the affect was normal [de-identified] : 5th ray resection left, right partial 2nd toe, right partial hallux amp [FreeTextEntry1] : Ulceration at the level of the IPJ of the left great toe.\par \par  [FreeTextEntry2] : 0.9 [FreeTextEntry3] : 0.8 [FreeTextEntry4] : 0.2 [de-identified] : 100 [de-identified] : bactroban [de-identified] : a sharp full thickness excisional debridement utilizing a scalpel tissue nipper as well as a curette into the subcutaneous tissue level was performed. Bleeding was mild and was controlled with pressure. , discharge orders as well as wound care orders were reviewed with the patient and a follow up appointment given, Bactroban to be applied on a daily basis\par Written aftercare and wound orders dispensed and r/w patient. they understand\par  [TWNoteComboBox1] : Left [TWNoteComboBox2] : 3 [TWNoteComboBox4] : Moderate [TWNoteComboBox5] : No [TWNoteComboBox6] : Diabetic [de-identified] : Yes [de-identified] : Normal [de-identified] : None [de-identified] : None [de-identified] : None [de-identified] : Yes [TWNoteComboBox7] : Vanessa [de-identified] : Debridement excisional

## 2022-11-16 ENCOUNTER — APPOINTMENT (OUTPATIENT)
Dept: PODIATRY | Facility: CLINIC | Age: 47
End: 2022-11-16

## 2022-11-16 VITALS — BODY MASS INDEX: 26.6 KG/M2 | HEIGHT: 71 IN | WEIGHT: 190 LBS

## 2022-11-16 PROCEDURE — 99213 OFFICE O/P EST LOW 20 MIN: CPT

## 2022-11-16 NOTE — REVIEW OF SYSTEMS
[Fever] : no fever [Chills] : no chills [Leg Claudication] : no intermittent leg claudication [Lower Ext Edema] : no extremity edema [As Noted in HPI] : as noted in HPI [Skin Lesions] : skin lesion [Skin Wound] : skin wound [FreeTextEntry9] : s/p multiple bilateral toe/ray amps 2/2 non healing DFU

## 2022-11-16 NOTE — PHYSICAL EXAM
[de-identified] : 5th ray resection left, right partial 2nd toe, right partial hallux amp [FreeTextEntry1] : Ulceration at the level of the IPJ of the left great toe.\par \par  [FreeTextEntry2] : 0.6 [FreeTextEntry3] : 0.5 [FreeTextEntry4] : 0.2 [de-identified] : maxsorb ag  [de-identified] : \par after 4 days switch to santyl/bactroban combo\par  [TWNoteComboBox1] : Left [TWNoteComboBox2] : 3 [TWNoteComboBox4] : Moderate [TWNoteComboBox5] : No [TWNoteComboBox6] : Diabetic [de-identified] : Yes [de-identified] : Normal [de-identified] : None [de-identified] : None [de-identified] : 100% [de-identified] : No [TWNoteComboBox7] : False [de-identified] : False [de-identified] : 2x Weekly [Vibration Dec.] : diminished vibratory sensation at the level of the toes [Diminished Throughout Right Foot] : diminished sensation with monofilament testing throughout right foot [Diminished Throughout Left Foot] : diminished sensation with monofilament testing throughout left foot [Oriented To Time, Place, And Person] : oriented to person, place, and time [Affect] : the affect was normal

## 2022-11-23 ENCOUNTER — APPOINTMENT (OUTPATIENT)
Dept: PODIATRY | Facility: HOSPITAL | Age: 47
End: 2022-11-23
Payer: COMMERCIAL

## 2022-11-23 VITALS — WEIGHT: 190 LBS | BODY MASS INDEX: 26.6 KG/M2 | HEIGHT: 71 IN

## 2022-11-23 PROCEDURE — 11042 DBRDMT SUBQ TIS 1ST 20SQCM/<: CPT

## 2022-11-23 NOTE — REVIEW OF SYSTEMS
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

## 2022-11-23 NOTE — PHYSICAL EXAM
[Vibration Dec.] : diminished vibratory sensation at the level of the toes [Diminished Throughout Right Foot] : diminished sensation with monofilament testing throughout right foot [Diminished Throughout Left Foot] : diminished sensation with monofilament testing throughout left foot [de-identified] : 5th ray resection left, right partial 2nd toe, right partial hallux amp [FreeTextEntry1] : Ulceration at the level of the IPJ of the left great toe.\par \par  [FreeTextEntry2] : 8cm--almost completely arount the circumference of the toe [FreeTextEntry3] : 2.3 [FreeTextEntry4] : 0.2 [de-identified] : 25 [de-identified] : 75 [de-identified] : bactroban daily [de-identified] : patient given surgical scrub brushes\par \par The patient has almost circumferential breakdown of the hallux and does appear to be from him applying a tight bandage and friction causing blistering and tissue breakdown [TWNoteComboBox1] : Left [TWNoteComboBox2] : 3 [TWNoteComboBox4] : Small [TWNoteComboBox5] : No [TWNoteComboBox6] : Diabetic [de-identified] : Yes [de-identified] : Erythema [de-identified] : None [de-identified] : None [de-identified] : False [de-identified] : Yes [TWNoteComboBox7] : Vanessa [de-identified] : Cultures obtained [de-identified] : Daily

## 2022-11-23 NOTE — PROCEDURE
[FreeTextEntry1] : Written aftercare and wound orders dispensed and r/w patient. they understand\par I have reviewed the care orders with the patient they understand and they also understands the long-term consequences of not following my wound care orders as well  precautions I have recommended  and shoe gear advice I have mentioned.\par a sharp full thickness excisional debridement utilizing a scalpel tissue nipper as well as a curette into the subcutaneous tissue level was performed. Bleeding was mild and was controlled with pressure. , discharge orders as well as wound care orders were reviewed with the patient and a follow up appointment given, Bactroban to be applied on a daily basis\par follow up appt 1 week\par \par \par

## 2022-11-23 NOTE — HISTORY OF PRESENT ILLNESS
[FreeTextEntry1] : Location- left great toe\par states he is almost finished oral and using topical abx as directed\par \par Patient once again exhibited less than appropriate hygiene\par foot remains dirty and not apparently washing and cleaning on a daily basis

## 2022-12-01 ENCOUNTER — APPOINTMENT (OUTPATIENT)
Dept: PODIATRY | Facility: CLINIC | Age: 47
End: 2022-12-01

## 2022-12-01 VITALS — BODY MASS INDEX: 26.6 KG/M2 | HEIGHT: 71 IN | WEIGHT: 190 LBS

## 2022-12-01 PROCEDURE — 99213 OFFICE O/P EST LOW 20 MIN: CPT

## 2022-12-01 NOTE — PHYSICAL EXAM
[Vibration Dec.] : diminished vibratory sensation at the level of the toes [Diminished Throughout Right Foot] : diminished sensation with monofilament testing throughout right foot [Diminished Throughout Left Foot] : diminished sensation with monofilament testing throughout left foot [de-identified] : 5th ray resection left, right partial 2nd toe, right partial hallux amp [FreeTextEntry1] : Ulceration at the level of the IPJ of the left great toe.which has recently worsened 2/2 to iatrogenically applied bandage at home\par \par  [FreeTextEntry2] : now not completely around the circumference of the toe it has interval healing [FreeTextEntry3] : 2.3 [FreeTextEntry4] : 0.2 [de-identified] : bactroban daily [TWNoteComboBox1] : Left [TWNoteComboBox2] : 3 [TWNoteComboBox4] : Small [TWNoteComboBox5] : No [TWNoteComboBox6] : Diabetic [de-identified] : Yes [de-identified] : Erythema [de-identified] : None [de-identified] : None [de-identified] : 100% [de-identified] : No [TWNoteComboBox7] : Vanessa [de-identified] : False [de-identified] : Daily

## 2022-12-01 NOTE — PROCEDURE
[FreeTextEntry1] : Written aftercare and wound orders dispensed and r/w patient. they understand\par I have reviewed the care orders with the patient they understand and they also understands the long-term consequences of not following my wound care orders as well  precautions I have recommended  and shoe gear advice I have mentioned.\par Stay in [facility]\par follow up appt 1 week\par \par \par

## 2022-12-01 NOTE — HISTORY OF PRESENT ILLNESS
[FreeTextEntry1] : Location- left great toe\par on topical and oral abx, has yet to start bactrim. advised to start as he has worse cx results

## 2022-12-02 LAB — BACTERIA WND CULT: ABNORMAL

## 2022-12-08 ENCOUNTER — APPOINTMENT (OUTPATIENT)
Dept: PODIATRY | Facility: CLINIC | Age: 47
End: 2022-12-08

## 2022-12-08 VITALS
SYSTOLIC BLOOD PRESSURE: 128 MMHG | HEIGHT: 71 IN | WEIGHT: 185 LBS | DIASTOLIC BLOOD PRESSURE: 70 MMHG | BODY MASS INDEX: 25.9 KG/M2

## 2022-12-08 PROCEDURE — 11042 DBRDMT SUBQ TIS 1ST 20SQCM/<: CPT

## 2022-12-08 NOTE — PHYSICAL EXAM
[Vibration Dec.] : diminished vibratory sensation at the level of the toes [Diminished Throughout Right Foot] : diminished sensation with monofilament testing throughout right foot [Diminished Throughout Left Foot] : diminished sensation with monofilament testing throughout left foot [de-identified] : 5th ray resection left, right partial 2nd toe, right partial hallux amp [FreeTextEntry1] : Ulceration at the level of the IPJ of the left great toe.which has recently worsened 2/2 to iatrogenically applied bandage at home\par \par  [de-identified] : 50 [de-identified] : bactroban daily [de-identified] : The wound looks clinically improved, it it now cluster type with the web space showing fibrin and the medial side 100% GT\par no cellulitis, no tunneling, no sinus tract, no drainage, no odor, mild periwound erythema, no evidence of infection\par  [TWNoteComboBox1] : Left [TWNoteComboBox2] : 3 [TWNoteComboBox4] : None [TWNoteComboBox5] : No [TWNoteComboBox6] : Diabetic [de-identified] : Yes [de-identified] : Erythema [de-identified] : None [de-identified] : None [de-identified] : 50% [de-identified] : Yes [TWNoteComboBox7] : Vanessa [de-identified] : Daily

## 2022-12-08 NOTE — PROCEDURE
[FreeTextEntry1] : a sharp full thickness excisional debridement utilizing a scalpel tissue nipper as well as a curette into the subcutaneous tissue level was performed. Bleeding was mild and was controlled with pressure. , discharge orders as well as wound care orders were reviewed with the patient and a follow up appointment given, Bactroban to be applied on a daily basis\par \par Written aftercare and wound orders dispensed and r/w patient. they understand\par I have reviewed the care orders with the patient they understand and they also understands the long-term consequences of not following my wound care orders as well  precautions I have recommended  and shoe gear advice I have mentioned.\par Stay in [facility]\par follow up appt 1 week\par \par \par

## 2022-12-15 ENCOUNTER — APPOINTMENT (OUTPATIENT)
Dept: PODIATRY | Facility: CLINIC | Age: 47
End: 2022-12-15

## 2022-12-15 VITALS — HEIGHT: 78 IN | BODY MASS INDEX: 21.4 KG/M2 | WEIGHT: 185 LBS

## 2022-12-15 PROCEDURE — 99213 OFFICE O/P EST LOW 20 MIN: CPT

## 2022-12-15 NOTE — PROCEDURE
[FreeTextEntry1] : \par \par Written aftercare and wound orders dispensed and r/w patient. they understand\par I have reviewed the care orders with the patient they understand and they also understands the long-term consequences of not following my wound care orders as well  precautions I have recommended  and shoe gear advice I have mentioned.\par Stay in [facility]\par follow up appt 4 weeks here \par He is going away fro 2 weeks, then I am also having sx\par \par \par

## 2022-12-15 NOTE — PHYSICAL EXAM
[Vibration Dec.] : diminished vibratory sensation at the level of the toes [Diminished Throughout Right Foot] : diminished sensation with monofilament testing throughout right foot [Diminished Throughout Left Foot] : diminished sensation with monofilament testing throughout left foot [de-identified] : 5th ray resection left, right partial 2nd toe, right partial hallux amp [FreeTextEntry1] : \par  [de-identified] : mixed [de-identified] : mixed [de-identified] : bactroban and santyl [de-identified] : The wound continues to improve. Medially is superficial PT, plantar is Full thickness but 100% GT. Lateral however is 100% fibrin and dense.\par no cellulitis, no tunneling, no sinus tract, no drainage, no odor, mild periwound erythema, no evidence of infection\par  [TWNoteComboBox1] : Left [TWNoteComboBox2] : 3 [TWNoteComboBox4] : None [TWNoteComboBox5] : No [TWNoteComboBox6] : Diabetic [de-identified] : Yes [de-identified] : Erythema [de-identified] : None [de-identified] : None [de-identified] : False [de-identified] : Yes [TWNoteComboBox7] : Autolytic [de-identified] : Daily

## 2023-01-07 ENCOUNTER — APPOINTMENT (OUTPATIENT)
Dept: PODIATRY | Facility: CLINIC | Age: 48
End: 2023-01-07
Payer: MEDICAID

## 2023-01-07 VITALS
HEART RATE: 91 BPM | BODY MASS INDEX: 26.6 KG/M2 | WEIGHT: 190 LBS | SYSTOLIC BLOOD PRESSURE: 128 MMHG | DIASTOLIC BLOOD PRESSURE: 87 MMHG | HEIGHT: 71 IN

## 2023-01-07 PROCEDURE — 99213 OFFICE O/P EST LOW 20 MIN: CPT

## 2023-01-07 RX ORDER — SILDENAFIL 100 MG/1
100 TABLET, FILM COATED ORAL
Qty: 6 | Refills: 0 | Status: DISCONTINUED | COMMUNITY
Start: 2022-06-29 | End: 2023-01-07

## 2023-01-07 RX ORDER — RAMIPRIL 2.5 MG/1
2.5 CAPSULE ORAL
Refills: 0 | Status: DISCONTINUED | COMMUNITY
End: 2023-01-07

## 2023-01-07 RX ORDER — SULFAMETHOXAZOLE AND TRIMETHOPRIM 800; 160 MG/1; MG/1
800-160 TABLET ORAL TWICE DAILY
Qty: 28 | Refills: 1 | Status: DISCONTINUED | COMMUNITY
Start: 2022-11-30 | End: 2023-01-07

## 2023-01-07 RX ORDER — MUPIROCIN 20 MG/G
2 OINTMENT TOPICAL
Qty: 1 | Refills: 3 | Status: DISCONTINUED | COMMUNITY
Start: 2022-06-30 | End: 2023-01-07

## 2023-01-07 RX ORDER — OXYCODONE 5 MG/1
5 TABLET ORAL
Qty: 10 | Refills: 0 | Status: DISCONTINUED | COMMUNITY
Start: 2022-10-02 | End: 2023-01-07

## 2023-01-07 RX ORDER — ROSUVASTATIN CALCIUM 10 MG/1
10 TABLET, FILM COATED ORAL
Qty: 90 | Refills: 0 | Status: DISCONTINUED | COMMUNITY
Start: 2022-08-02 | End: 2023-01-07

## 2023-01-07 RX ORDER — CEPHALEXIN 500 MG/1
500 CAPSULE ORAL 4 TIMES DAILY
Qty: 40 | Refills: 1 | Status: DISCONTINUED | COMMUNITY
Start: 2022-11-05 | End: 2023-01-07

## 2023-01-07 RX ORDER — ROSUVASTATIN CALCIUM 5 MG/1
5 TABLET, FILM COATED ORAL
Refills: 0 | Status: DISCONTINUED | COMMUNITY
End: 2023-01-07

## 2023-01-07 RX ORDER — LINEZOLID 600 MG/1
600 TABLET, FILM COATED ORAL TWICE DAILY
Qty: 20 | Refills: 0 | Status: DISCONTINUED | COMMUNITY
Start: 2022-11-28 | End: 2023-01-07

## 2023-01-07 RX ORDER — GLIPIZIDE 5 MG/1
5 TABLET ORAL
Refills: 0 | Status: DISCONTINUED | COMMUNITY
End: 2023-01-07

## 2023-01-07 RX ORDER — GABAPENTIN 100 MG/1
100 CAPSULE ORAL
Qty: 270 | Refills: 0 | Status: DISCONTINUED | COMMUNITY
Start: 2022-07-28 | End: 2023-01-07

## 2023-01-07 RX ORDER — UBIDECARENONE/VIT E ACET 100MG-5
1000 CAPSULE ORAL
Refills: 0 | Status: DISCONTINUED | COMMUNITY
End: 2023-01-07

## 2023-01-07 NOTE — PHYSICAL EXAM
[Vibration Dec.] : diminished vibratory sensation at the level of the toes [Diminished Throughout Right Foot] : diminished sensation with monofilament testing throughout right foot [Diminished Throughout Left Foot] : diminished sensation with monofilament testing throughout left foot [de-identified] : 5th ray resection left, right partial 2nd toe, right partial hallux amp [FreeTextEntry1] : \par  [de-identified] : mixed [de-identified] : mixed [de-identified] : bactroban [de-identified] : medial side of Gt ulcer healed now only the lateral side remains but improved L W and depth [TWNoteComboBox1] : Left [TWNoteComboBox2] : 3 [TWNoteComboBox4] : None [TWNoteComboBox5] : No [TWNoteComboBox6] : Diabetic [de-identified] : Yes [de-identified] : Erythema [de-identified] : None [de-identified] : None [TWNoteComboBox7] : Autolytic [de-identified] : Yes [de-identified] : Daily

## 2023-01-07 NOTE — PROCEDURE
[FreeTextEntry1] : \par Written aftercare and wound orders dispensed and r/w patient. they understand\par I have reviewed the care orders with the patient they understand and they also understands the long-term consequences of not following my wound care orders as well  precautions I have recommended  and shoe gear advice I have mentioned.\par Stay in [facility]\par follow up appt 2 weeks here \par \par \par \par

## 2023-01-20 ENCOUNTER — APPOINTMENT (OUTPATIENT)
Dept: PODIATRY | Facility: CLINIC | Age: 48
End: 2023-01-20
Payer: MEDICAID

## 2023-01-20 VITALS — HEIGHT: 71 IN | WEIGHT: 190 LBS | BODY MASS INDEX: 26.6 KG/M2

## 2023-01-20 DIAGNOSIS — L97.512 NON-PRESSURE CHRONIC ULCER OF OTHER PART OF RIGHT FOOT WITH FAT LAYER EXPOSED: ICD-10-CM

## 2023-01-20 PROCEDURE — 99213 OFFICE O/P EST LOW 20 MIN: CPT | Mod: 25

## 2023-01-20 PROCEDURE — 11042 DBRDMT SUBQ TIS 1ST 20SQCM/<: CPT

## 2023-01-20 NOTE — REVIEW OF SYSTEMS
[Skin Wound] : skin wound [Fever] : no fever [Chills] : no chills [Leg Claudication] : no intermittent leg claudication [Lower Ext Edema] : no extremity edema

## 2023-01-20 NOTE — PHYSICAL EXAM
[Foot Ulcer] : foot ulcer [Vibration Dec.] : diminished vibratory sensation at the level of the toes [Diminished Throughout Right Foot] : diminished sensation with monofilament testing throughout right foot [Diminished Throughout Left Foot] : diminished sensation with monofilament testing throughout left foot [de-identified] : 5th ray resection left, right partial 2nd toe, right partial hallux amp [FreeTextEntry1] : \par  [de-identified] : centrally-also has surrounding PT [de-identified] : 50 [de-identified] : iodopsorb [de-identified] : Dimensions:\par 1.5 x 1.5 x 0.1--surroiunding % GT\par Full thickness centrally:\par 0.5 x 0.3 x 0.2 100% lacie [TWNoteComboBox1] : Left [TWNoteComboBox2] : 3 [TWNoteComboBox3] : FT [TWNoteComboBox4] : Small [TWNoteComboBox5] : No [TWNoteComboBox6] : Diabetic [de-identified] : Yes [de-identified] : Normal [de-identified] : None [de-identified] : None [de-identified] : 50% [de-identified] : Yes [TWNoteComboBox7] : Autolytic [de-identified] : Daily

## 2023-01-20 NOTE — PROCEDURE
[FreeTextEntry1] : a sharp full thickness excisional debridement utilizing a scalpel tissue nipper as well as a curette into the subcutaneous tissue level was performed. Bleeding was mild and was controlled with pressure. , discharge orders as well as wound care orders were reviewed with the patient and a follow up appointment given. , Iodosorb and a dry sterile dressing was applied. The patient was given discharge wound care instructions which included Iodosorb every day and to return to the office as scheduled\par \par stay in sx shoe\par I have reviewed the care orders with the patient they understand and they also understands the long-term consequences of not following my wound care orders as well  precautions I have recommended  and shoe gear advice I have mentioned.\par Written aftercare and wound orders dispensed and r/w patient. they understand\par \par \par \par \par

## 2023-01-26 ENCOUNTER — APPOINTMENT (OUTPATIENT)
Dept: PODIATRY | Facility: CLINIC | Age: 48
End: 2023-01-26
Payer: MEDICAID

## 2023-01-26 VITALS — WEIGHT: 190 LBS | BODY MASS INDEX: 26.6 KG/M2 | HEIGHT: 71 IN

## 2023-01-26 PROCEDURE — 99213 OFFICE O/P EST LOW 20 MIN: CPT

## 2023-01-26 NOTE — PROCEDURE
[FreeTextEntry1] : I have had a lengthy discussion with the patient regarding overall skincare. The importance of the type of socks, the type of shoes, and the type of overall foot hygiene is important to help control and prevent eruptions especially over extreme weather changes. This included but was not limited to hydration and lubrication, dove soap, triple rinse clothing and linens. I also explained the importance of thorough drying of both feet especially the web spaces. Given the extreme temperatures back in a car I also reviewed the type of shoes that would help reduce the chances of cracking of the skin especially leading to fissuring of the heels. Overall skincare precautions were reviewed education literature dispensed in the patient's questions asked and answered appropriately.\par Written aftercare and wound orders dispensed and r/w patient. they understand\par \par \par stay in sx shoe for 1 week\par follow up appt 2 weeks\par \par \par \par \par

## 2023-01-26 NOTE — REVIEW OF SYSTEMS
[Fever] : no fever [Chills] : no chills [Leg Claudication] : no intermittent leg claudication [Lower Ext Edema] : no extremity edema [Skin Wound] : skin wound

## 2023-01-26 NOTE — PHYSICAL EXAM
[de-identified] : 5th ray resection left, right partial 2nd toe, right partial hallux amp [FreeTextEntry1] : \par  [Foot Ulcer] : foot ulcer [de-identified] : bactroban [TWNoteComboBox1] : Left [TWNoteComboBox2] : 2 [TWNoteComboBox3] : PT [TWNoteComboBox4] : None [TWNoteComboBox5] : No [de-identified] : No [TWNoteComboBox6] : Diabetic [de-identified] : Normal [de-identified] : None [de-identified] : None [de-identified] : 100% [de-identified] : No [TWNoteComboBox7] : False [de-identified] : Daily [Vibration Dec.] : diminished vibratory sensation at the level of the toes [Diminished Throughout Right Foot] : diminished sensation with monofilament testing throughout right foot [Diminished Throughout Left Foot] : diminished sensation with monofilament testing throughout left foot

## 2023-02-09 ENCOUNTER — APPOINTMENT (OUTPATIENT)
Dept: PODIATRY | Facility: CLINIC | Age: 48
End: 2023-02-09
Payer: MEDICAID

## 2023-02-09 VITALS — BODY MASS INDEX: 26.6 KG/M2 | HEIGHT: 71 IN | WEIGHT: 190 LBS

## 2023-02-09 PROCEDURE — 99213 OFFICE O/P EST LOW 20 MIN: CPT

## 2023-02-09 NOTE — PROCEDURE
[FreeTextEntry1] : A lengthy discussion with the patient regarding diabetes and the manifestations second occur in the feet. The discussion included but was not limited to nail care, treatment of open wound, treatment of calluses, shoe gear, as well as statistics regarding diabetes as it relates to loss of toe, toes, midfoot, as well as mortality Re: diabetics wind up with a midfoot amputation, transmetatarsal amputation, as well as 50 percent of diabetics who suffer loss of the leg suffering mortality within 5 years. Educational literature was dispensed. Overall diabetic education as it regard to the foot was discussed with all questions asked and answered appropriately. I've advised the patient should there be any concerns regarding nails, infection, open wounds, interdigital problems, or acute injury that they should contact the office immediately.\par follow up appt 4 weeks\par

## 2023-02-09 NOTE — PHYSICAL EXAM
[de-identified] : 5th ray resection left, right partial 2nd toe, right partial hallux amp [Foot Ulcer] : foot ulcer [FreeTextEntry1] : GT--healed [TWNoteComboBox1] : Left [TWNoteComboBox2] : False [TWNoteComboBox3] : False [TWNoteComboBox4] : None [TWNoteComboBox5] : No [TWNoteComboBox6] : Diabetic [de-identified] : No [de-identified] : Normal [de-identified] : None [de-identified] : None [de-identified] : False [de-identified] : False [de-identified] : False [Vibration Dec.] : diminished vibratory sensation at the level of the toes [Diminished Throughout Right Foot] : diminished sensation with monofilament testing throughout right foot [Diminished Throughout Left Foot] : diminished sensation with monofilament testing throughout left foot

## 2023-03-09 ENCOUNTER — APPOINTMENT (OUTPATIENT)
Dept: PODIATRY | Facility: CLINIC | Age: 48
End: 2023-03-09
Payer: MEDICAID

## 2023-03-09 VITALS — BODY MASS INDEX: 26.6 KG/M2 | WEIGHT: 190 LBS | HEIGHT: 71 IN

## 2023-03-09 PROCEDURE — 99213 OFFICE O/P EST LOW 20 MIN: CPT

## 2023-03-09 NOTE — PHYSICAL EXAM
[Foot Ulcer] : foot ulcer [Vibration Dec.] : diminished vibratory sensation at the level of the toes [Diminished Throughout Right Foot] : diminished sensation with monofilament testing throughout right foot [Diminished Throughout Left Foot] : diminished sensation with monofilament testing throughout left foot [de-identified] : 5th ray resection left, right partial 2nd toe, right partial hallux amp [FreeTextEntry1] : GT--healed [TWNoteComboBox1] : Left [TWNoteComboBox4] : None [TWNoteComboBox6] : Diabetic [TWNoteComboBox5] : No [de-identified] : No [de-identified] : Normal [de-identified] : None [de-identified] : None

## 2023-03-09 NOTE — PROCEDURE
[FreeTextEntry1] : I have had a lengthy discussion with the patient regarding overall skincare. The importance of the type of socks, the type of shoes, and the type of overall foot hygiene is important to help control and prevent eruptions especially over extreme weather changes. This included but was not limited to hydration and lubrication, dove soap, triple rinse clothing and linens. I also explained the importance of thorough drying of both feet especially the web spaces. Given the extreme temperatures back in a car I also reviewed the type of shoes that would help reduce the chances of cracking of the skin especially leading to fissuring of the heels. Overall skincare precautions were reviewed education literature dispensed in the patient's questions asked and answered appropriately.\par A complete and thorough evaluation of the type of shoes they should be wearing and type of shoes for this time of year was discussed with patient.\par \par \par follow up appt 4 weeks\par

## 2023-04-06 ENCOUNTER — APPOINTMENT (OUTPATIENT)
Dept: PODIATRY | Facility: CLINIC | Age: 48
End: 2023-04-06
Payer: MEDICAID

## 2023-04-06 VITALS — WEIGHT: 190 LBS | HEIGHT: 71 IN | BODY MASS INDEX: 26.6 KG/M2

## 2023-04-06 PROCEDURE — 11056 PARNG/CUTG B9 HYPRKR LES 2-4: CPT

## 2023-04-06 PROCEDURE — 99213 OFFICE O/P EST LOW 20 MIN: CPT | Mod: 25

## 2023-04-06 PROCEDURE — 11721 DEBRIDE NAIL 6 OR MORE: CPT | Mod: 59

## 2023-04-06 RX ORDER — TERBINAFINE HYDROCHLORIDE 250 MG/1
250 TABLET ORAL
Qty: 30 | Refills: 1 | Status: ACTIVE | COMMUNITY
Start: 2023-04-06 | End: 1900-01-01

## 2023-04-06 NOTE — REVIEW OF SYSTEMS
[Fever] : no fever [Chills] : no chills [Leg Claudication] : no intermittent leg claudication [Lower Ext Edema] : no extremity edema [As Noted in HPI] : as noted in HPI [Skin Lesions] : no skin lesions [Skin Wound] : no skin wound

## 2023-04-06 NOTE — PROCEDURE
[FreeTextEntry1] : A lengthy discussion with the patient regarding diabetes and the manifestations second occur in the feet. The discussion included but was not limited to nail care, treatment of open wound, treatment of calluses, shoe gear, as well as statistics regarding diabetes as it relates to loss of toe, toes, midfoot, as well as mortality Re: diabetics wind up with a midfoot amputation, transmetatarsal amputation, as well as 50 percent of diabetics who suffer loss of the leg suffering mortality within 5 years. Educational literature was dispensed. Overall diabetic education as it regard to the foot was discussed with all questions asked and answered appropriately. I've advised the patient should there be any concerns regarding nails, infection, open wounds, interdigital problems, or acute injury that they should contact the office immediately.\par Using sterile instrumentation debridement of all nails manually and electrically to decrease thickness, pain and girth and make shoe gear more comfortable with "slant back" procedure of any bordering spicules causing pain\par \par Utilizing a scalpel and sterile aseptic technique sharp debridement of multiple hyperkeratotic lesions performed. Appropriate padding were necessary.\par A lengthy and inform a discussion with the patient regarding different types of treatments for the mycotic nail disease. I stressed clinical versus mycologic cure rates and anticipated success rates regarding topical medications oral medications as well as laser therapy. I discussed in great length with the patient the success rates and success rates anticipated. There were no guarantees given regarding any of the treatment reviewed. I did explain to the patient that there are risks to oral antifungal therapy however those risks can be greatly decreased with obtaining blood tests prior and possibly during the treatment if indicated. The patient will start oral AF

## 2023-04-06 NOTE — HISTORY OF PRESENT ILLNESS
[FreeTextEntry1] : The patient presents for follow up of chronic and painful mycotic nail disease as well as painful kyperkeratoses. Past professional  tx's in the past in the presence of PAD have consisted of periodic debridements of both nails and painful keratoses which have offered significant relief in controlling of symptoms and the patient presents for follow up care.\par States that now he feels he has his ulcerations and diabetes under control he is concerned about the thickness and pain he is getting from several nails on both feet\par

## 2023-04-06 NOTE — PHYSICAL EXAM
[de-identified] : 5th ray resection left, right partial 2nd toe, right partial hallux amp [Skin Color & Pigmentation] : normal skin color and pigmentation [Skin Turgor] : normal skin turgor [] : no rash [Foot Ulcer] : no foot ulcer [Skin Induration] : no skin induration [FreeTextEntry1] : GT--healed [TWNoteComboBox1] : Left [TWNoteComboBox4] : None [TWNoteComboBox5] : No [TWNoteComboBox6] : Diabetic [de-identified] : No [de-identified] : Normal [de-identified] : None [de-identified] : None [Vibration Dec.] : diminished vibratory sensation at the level of the toes [Diminished Throughout Right Foot] : diminished sensation with monofilament testing throughout right foot [Diminished Throughout Left Foot] : diminished sensation with monofilament testing throughout left foot

## 2023-05-11 ENCOUNTER — APPOINTMENT (OUTPATIENT)
Dept: PODIATRY | Facility: CLINIC | Age: 48
End: 2023-05-11
Payer: MEDICAID

## 2023-05-11 VITALS — HEIGHT: 71 IN | WEIGHT: 190 LBS | BODY MASS INDEX: 26.6 KG/M2

## 2023-05-11 DIAGNOSIS — S90.821A BLISTER (NONTHERMAL), RIGHT FOOT, INITIAL ENCOUNTER: ICD-10-CM

## 2023-05-11 PROCEDURE — 11056 PARNG/CUTG B9 HYPRKR LES 2-4: CPT

## 2023-05-11 PROCEDURE — 99213 OFFICE O/P EST LOW 20 MIN: CPT | Mod: 25

## 2023-05-11 NOTE — PHYSICAL EXAM
[Skin Color & Pigmentation] : normal skin color and pigmentation [Skin Turgor] : normal skin turgor [] : no rash [Vibration Dec.] : diminished vibratory sensation at the level of the toes [Diminished Throughout Right Foot] : diminished sensation with monofilament testing throughout right foot [Diminished Throughout Left Foot] : diminished sensation with monofilament testing throughout left foot [de-identified] : 5th ray resection left, right partial 2nd toe, right partial hallux amp [Foot Ulcer] : no foot ulcer [Skin Induration] : no skin induration [FreeTextEntry1] : The patient has all contributing factors to onychomycosis including but not limited to thickness, subungual debris, discoloration and partial lysis and they are brittle when cut.\par Painful hyperkeratotic lesions noted sub 5 right and left GT\par Does have evidence of a flattened, non infected blister sub 3-4 right foot [TWNoteComboBox1] : Left [TWNoteComboBox4] : None [TWNoteComboBox5] : No [TWNoteComboBox6] : Diabetic [de-identified] : No [de-identified] : Normal [de-identified] : None [de-identified] : None

## 2023-05-11 NOTE — HISTORY OF PRESENT ILLNESS
[FreeTextEntry1] : The patient presents for follow up of chronic and painful mycotic nail disease as well as painful kyperkeratoses. Past professional  tx's in the past in the presence of PAD have consisted of periodic debridements of both nails and painful keratoses which have offered significant relief in controlling of symptoms and the patient presents for follow up care.\par \par In addition to the diabetic checkup he states he recently started to work as a  1 day a week and he thinks that approximately 2 weeks ago on a Monday he was working an extended shift and developed a blister on the bottom of the right foot.

## 2023-05-11 NOTE — REVIEW OF SYSTEMS
[As Noted in HPI] : as noted in HPI [Fever] : no fever [Chills] : no chills [Leg Claudication] : no intermittent leg claudication [Lower Ext Edema] : no extremity edema [Skin Lesions] : skin lesion [Skin Wound] : no skin wound

## 2023-05-11 NOTE — REASON FOR VISIT
[Follow-Up Visit] : a follow-up visit for [FreeTextEntry2] : diabetes check up and blister sub 3-4 right

## 2023-05-11 NOTE — PROCEDURE
[FreeTextEntry1] : \par Utilizing a scalpel and sterile aseptic technique sharp debridement of multiple hyperkeratotic lesions performed. Appropriate padding were necessary.\par I have reviewed the care orders with the patient they understand and they also understands the long-term consequences of not following my wound care orders as well  precautions I have recommended  and shoe gear advice I have mentioned--betadine daily and alleyvyne\par A lengthy and inform a discussion with the patient regarding different types of treatments for the mycotic nail disease. I stressed clinical versus mycologic cure rates and anticipated success rates regarding topical medications oral medications as well as laser therapy. I discussed in great length with the patient the success rates and success rates anticipated. There were no guarantees given regarding any of the treatment reviewed. I did explain to the patient that there are risks to oral antifungal therapy however those risks can be greatly decreased with obtaining blood tests prior and possibly during the treatment if indicated. The patient will start oral AF

## 2023-06-08 ENCOUNTER — APPOINTMENT (OUTPATIENT)
Dept: PODIATRY | Facility: CLINIC | Age: 48
End: 2023-06-08
Payer: MEDICAID

## 2023-06-08 VITALS — BODY MASS INDEX: 26.6 KG/M2 | HEIGHT: 71 IN | WEIGHT: 190 LBS

## 2023-06-08 PROCEDURE — 11056 PARNG/CUTG B9 HYPRKR LES 2-4: CPT

## 2023-06-08 PROCEDURE — 11721 DEBRIDE NAIL 6 OR MORE: CPT | Mod: 59

## 2023-06-08 NOTE — HISTORY OF PRESENT ILLNESS
[FreeTextEntry1] : The patient presents for follow up of chronic and painful mycotic nail disease as well as painful kyperkeratoses. Past professional  tx's in the past in the presence of PAD have consisted of periodic debridements of both nails and painful keratoses which have offered significant relief in controlling of symptoms and the patient presents for follow up care.\par \par

## 2023-06-08 NOTE — PHYSICAL EXAM
[de-identified] : 5th ray resection left, right partial 2nd toe, right partial hallux amp [Skin Color & Pigmentation] : normal skin color and pigmentation [Skin Turgor] : normal skin turgor [] : no rash [Foot Ulcer] : no foot ulcer [Skin Induration] : no skin induration [FreeTextEntry1] : The patient has all contributing factors to onychomycosis including but not limited to thickness, subungual debris, discoloration and partial lysis and they are brittle when cut.\par Painful hyperkeratotic lesions noted sub 5 right and left GT and distal 2 left\par Does have evidence of a flattened, non infected blister sub 3-4 right foot [Vibration Dec.] : diminished vibratory sensation at the level of the toes [Diminished Throughout Right Foot] : diminished sensation with monofilament testing throughout right foot [Diminished Throughout Left Foot] : diminished sensation with monofilament testing throughout left foot

## 2023-06-08 NOTE — PROCEDURE
[FreeTextEntry1] : Prior blister noted beneath the right foot has healed uneventfully\par Utilizing a scalpel and sterile aseptic technique sharp debridement of multiple hyperkeratotic lesions performed. Appropriate padding were necessary.\par \par A lengthy and inform a discussion with the patient regarding different types of treatments for the mycotic nail disease. I stressed clinical versus mycologic cure rates and anticipated success rates regarding topical medications oral medications as well as laser therapy. I discussed in great length with the patient the success rates and success rates anticipated. There were no guarantees given regarding any of the treatment reviewed. I did explain to the patient that there are risks to oral antifungal therapy however those risks can be greatly decreased with obtaining blood tests prior and possibly during the treatment if indicated. The patient will start oral AF

## 2023-06-08 NOTE — REVIEW OF SYSTEMS
[Fever] : no fever [Chills] : no chills [Leg Claudication] : no intermittent leg claudication [Lower Ext Edema] : no extremity edema [As Noted in HPI] : as noted in HPI [Skin Lesions] : skin lesion [Skin Wound] : no skin wound

## 2023-07-14 ENCOUNTER — APPOINTMENT (OUTPATIENT)
Dept: PODIATRY | Facility: CLINIC | Age: 48
End: 2023-07-14
Payer: MEDICAID

## 2023-07-14 VITALS — HEIGHT: 71 IN | BODY MASS INDEX: 26.6 KG/M2 | WEIGHT: 190 LBS

## 2023-07-14 PROCEDURE — 11721 DEBRIDE NAIL 6 OR MORE: CPT | Mod: 59

## 2023-07-14 PROCEDURE — 11056 PARNG/CUTG B9 HYPRKR LES 2-4: CPT

## 2023-07-14 PROCEDURE — 99213 OFFICE O/P EST LOW 20 MIN: CPT | Mod: 25

## 2023-07-14 NOTE — PROCEDURE
[FreeTextEntry1] : A lengthy discussion with the patient regarding diabetes and the manifestations second occur in the feet. The discussion included but was not limited to nail care, treatment of open wound, treatment of calluses, shoe gear, as well as statistics regarding diabetes as it relates to loss of toe, toes, midfoot, as well as mortality Re: diabetics wind up with a midfoot amputation, transmetatarsal amputation, as well as 50 percent of diabetics who suffer loss of the leg suffering mortality within 5 years. Educational literature was dispensed. Overall diabetic education as it regard to the foot was discussed with all questions asked and answered appropriately. I've advised the patient should there be any concerns regarding nails, infection, open wounds, interdigital problems, or acute injury that they should contact the office immediately.\par The patient presents with a pair of orthotics, and I have fitted them to their feet and they fit nicely with no evidence of breakdown. I advised them to continue to use them on a consistent basis\par \par Using sterile instrumentation debridement of all nails manually and electrically to decrease thickness, pain and girth and make shoe gear more comfortable with "slant back" procedure of any bordering spicules causing pain\par \par Utilizing a scalpel and sterile aseptic technique sharp debridement of multiple hyperkeratotic lesions performed. Appropriate padding were necessary.\par \par follow up appt 2 weeks\par

## 2023-07-14 NOTE — PHYSICAL EXAM
[de-identified] : 5th ray resection left, right partial 2nd toe, right partial hallux amp [Skin Color & Pigmentation] : normal skin color and pigmentation [Skin Turgor] : normal skin turgor [] : no rash [Foot Ulcer] : no foot ulcer [Skin Induration] : no skin induration [FreeTextEntry1] : The patient has all contributing factors to onychomycosis including but not limited to thickness, subungual debris, discoloration and partial lysis and they are brittle when cut.\par Painful hyperkeratotic lesions noted sub 5 right and left GT and distal 2 left\par Does have evidence of a flattened, non infected blister sub 3-4 right foot

## 2023-07-14 NOTE — REVIEW OF SYSTEMS
[Joint Swelling] : no joint swelling [Joint Stiffness] : no joint stiffness [Limb Pain] : no limb pain [Limb Swelling] : no limb swelling [Skin Lesions] : skin lesion [Skin Wound] : no skin wound [Itching] : no itching [Dry Skin] : dry skin [Negative] : Constitutional

## 2023-07-28 ENCOUNTER — APPOINTMENT (OUTPATIENT)
Dept: PODIATRY | Facility: CLINIC | Age: 48
End: 2023-07-28
Payer: MEDICAID

## 2023-07-28 VITALS
HEIGHT: 71 IN | DIASTOLIC BLOOD PRESSURE: 80 MMHG | WEIGHT: 190 LBS | SYSTOLIC BLOOD PRESSURE: 128 MMHG | BODY MASS INDEX: 26.6 KG/M2

## 2023-07-28 PROCEDURE — 11056 PARNG/CUTG B9 HYPRKR LES 2-4: CPT

## 2023-07-28 NOTE — PHYSICAL EXAM
[de-identified] : 5th ray resection left, right partial 2nd toe, right partial hallux amp [Skin Color & Pigmentation] : normal skin color and pigmentation [Skin Turgor] : normal skin turgor [] : no rash [Foot Ulcer] : no foot ulcer [Skin Induration] : no skin induration [FreeTextEntry1] : The patient has all contributing factors to onychomycosis including but not limited to thickness, subungual debris, discoloration and partial lysis and they are brittle when cut.\par Painful hyperkeratotic lesions noted sub 5 right and left GT and distal 2 left\par Does have evidence of a flattened, non infected blister sub 3-4 right foot

## 2023-07-28 NOTE — PROCEDURE
[FreeTextEntry1] : \par Using sterile instrumentation debridement of all nails manually and electrically to decrease thickness, pain and girth and make shoe gear more comfortable with "slant back" procedure of any bordering spicules causing pain\par \par Utilizing a scalpel and sterile aseptic technique sharp debridement of multiple hyperkeratotic lesions performed. Appropriate padding were necessary.\par \par follow up appt 4 weeks\par

## 2023-08-21 ENCOUNTER — APPOINTMENT (OUTPATIENT)
Dept: PODIATRY | Facility: CLINIC | Age: 48
End: 2023-08-21
Payer: MEDICAID

## 2023-08-21 VITALS
WEIGHT: 190 LBS | OXYGEN SATURATION: 97 % | DIASTOLIC BLOOD PRESSURE: 69 MMHG | HEART RATE: 111 BPM | SYSTOLIC BLOOD PRESSURE: 105 MMHG | HEIGHT: 71 IN | BODY MASS INDEX: 26.6 KG/M2

## 2023-08-21 PROCEDURE — 11721 DEBRIDE NAIL 6 OR MORE: CPT | Mod: 59

## 2023-08-21 PROCEDURE — 11056 PARNG/CUTG B9 HYPRKR LES 2-4: CPT | Mod: Q8

## 2023-08-21 PROCEDURE — 99213 OFFICE O/P EST LOW 20 MIN: CPT | Mod: 25

## 2023-08-21 NOTE — PHYSICAL EXAM
[Skin Color & Pigmentation] : normal skin color and pigmentation [Skin Turgor] : normal skin turgor [] : no rash [de-identified] : 5th ray resection left, right partial 2nd toe, right partial hallux amp [Foot Ulcer] : no foot ulcer [Skin Induration] : no skin induration [FreeTextEntry1] : The patient has all contributing factors to onychomycosis including but not limited to thickness, subungual debris, discoloration and partial lysis and they are brittle when cut. affected nails show proximal clearing Painful hyperkeratotic lesions noted sub 5 right and left GT and distal 2 left

## 2023-08-21 NOTE — REVIEW OF SYSTEMS
[Skin Lesions] : skin lesion [Dry Skin] : dry skin [Negative] : Constitutional [Joint Swelling] : no joint swelling [Joint Stiffness] : no joint stiffness [Limb Pain] : no limb pain [Limb Swelling] : no limb swelling [Skin Wound] : no skin wound [Itching] : no itching

## 2023-08-21 NOTE — PROCEDURE
[FreeTextEntry1] : A lengthy and inform a discussion with the patient regarding different types of treatments for the mycotic nail disease. I stressed clinical versus mycologic cure rates and anticipated success rates regarding topical medications oral medications as well as laser therapy. I discussed in great length with the patient the success rates and success rates anticipated. There were no guarantees given regarding any of the treatment reviewed.  Using sterile instrumentation debridement of all nails manually and electrically to decrease thickness, pain and girth and make shoe gear more comfortable with "slant back" procedure of any bordering spicules causing pain Utilizing a scalpel and sterile aseptic technique sharp debridement of multiple hyperkeratotic lesions performed. Appropriate padding were necessary.  follow up appt 4 weeks

## 2023-09-28 ENCOUNTER — APPOINTMENT (OUTPATIENT)
Dept: PODIATRY | Facility: CLINIC | Age: 48
End: 2023-09-28
Payer: MEDICAID

## 2023-09-28 PROCEDURE — 11721 DEBRIDE NAIL 6 OR MORE: CPT | Mod: 59

## 2023-09-28 PROCEDURE — 11057 PARNG/CUTG B9 HYPRKR LES >4: CPT

## 2023-10-26 ENCOUNTER — APPOINTMENT (OUTPATIENT)
Dept: PODIATRY | Facility: CLINIC | Age: 48
End: 2023-10-26
Payer: MEDICAID

## 2023-10-26 DIAGNOSIS — Z89.432 ACQUIRED ABSENCE OF LEFT FOOT: ICD-10-CM

## 2023-10-26 PROCEDURE — 11721 DEBRIDE NAIL 6 OR MORE: CPT | Mod: 59

## 2023-10-26 PROCEDURE — 11042 DBRDMT SUBQ TIS 1ST 20SQCM/<: CPT | Mod: 59,LT

## 2023-10-26 PROCEDURE — 11057 PARNG/CUTG B9 HYPRKR LES >4: CPT

## 2023-10-26 PROCEDURE — 99213 OFFICE O/P EST LOW 20 MIN: CPT | Mod: 25

## 2023-11-02 ENCOUNTER — APPOINTMENT (OUTPATIENT)
Dept: PAIN MANAGEMENT | Facility: CLINIC | Age: 48
End: 2023-11-02
Payer: MEDICAID

## 2023-11-02 ENCOUNTER — APPOINTMENT (OUTPATIENT)
Dept: PODIATRY | Facility: CLINIC | Age: 48
End: 2023-11-02
Payer: MEDICAID

## 2023-11-02 VITALS
SYSTOLIC BLOOD PRESSURE: 123 MMHG | WEIGHT: 190 LBS | DIASTOLIC BLOOD PRESSURE: 82 MMHG | HEIGHT: 71 IN | BODY MASS INDEX: 26.6 KG/M2

## 2023-11-02 VITALS — BODY MASS INDEX: 26.6 KG/M2 | WEIGHT: 190 LBS | HEIGHT: 71 IN

## 2023-11-02 DIAGNOSIS — M79.2 NEURALGIA AND NEURITIS, UNSPECIFIED: ICD-10-CM

## 2023-11-02 DIAGNOSIS — M79.18 MYALGIA, OTHER SITE: ICD-10-CM

## 2023-11-02 DIAGNOSIS — M54.12 RADICULOPATHY, CERVICAL REGION: ICD-10-CM

## 2023-11-02 PROCEDURE — 99204 OFFICE O/P NEW MOD 45 MIN: CPT

## 2023-11-02 PROCEDURE — 99213 OFFICE O/P EST LOW 20 MIN: CPT

## 2023-11-02 RX ORDER — GABAPENTIN 300 MG/1
300 CAPSULE ORAL
Qty: 30 | Refills: 0 | Status: ACTIVE | COMMUNITY
Start: 2023-11-02 | End: 1900-01-01

## 2023-11-02 RX ORDER — GLIPIZIDE AND METFORMIN HYDROCHLORIDE 5; 500 MG/1; MG/1
5-500 TABLET, FILM COATED ORAL
Refills: 0 | Status: ACTIVE | COMMUNITY

## 2023-11-10 ENCOUNTER — RESULT REVIEW (OUTPATIENT)
Age: 48
End: 2023-11-10

## 2023-11-13 ENCOUNTER — APPOINTMENT (OUTPATIENT)
Dept: PODIATRY | Facility: CLINIC | Age: 48
End: 2023-11-13
Payer: MEDICAID

## 2023-11-13 PROCEDURE — 11056 PARNG/CUTG B9 HYPRKR LES 2-4: CPT

## 2023-11-13 PROCEDURE — 99213 OFFICE O/P EST LOW 20 MIN: CPT | Mod: 25

## 2023-11-20 ENCOUNTER — APPOINTMENT (OUTPATIENT)
Dept: PODIATRY | Facility: CLINIC | Age: 48
End: 2023-11-20
Payer: MEDICAID

## 2023-11-20 VITALS — BODY MASS INDEX: 26.6 KG/M2 | WEIGHT: 190 LBS | HEIGHT: 71 IN

## 2023-11-20 PROCEDURE — 97763 ORTHC/PROSTC MGMT SBSQ ENC: CPT

## 2023-11-20 PROCEDURE — 99213 OFFICE O/P EST LOW 20 MIN: CPT

## 2023-11-20 PROCEDURE — L8499: CPT

## 2023-12-06 ENCOUNTER — APPOINTMENT (OUTPATIENT)
Dept: PODIATRY | Facility: CLINIC | Age: 48
End: 2023-12-06
Payer: MEDICAID

## 2023-12-06 VITALS — HEIGHT: 71 IN | BODY MASS INDEX: 26.6 KG/M2 | WEIGHT: 190 LBS

## 2023-12-06 PROCEDURE — 11042 DBRDMT SUBQ TIS 1ST 20SQCM/<: CPT

## 2023-12-20 ENCOUNTER — APPOINTMENT (OUTPATIENT)
Dept: PODIATRY | Facility: CLINIC | Age: 48
End: 2023-12-20
Payer: MEDICAID

## 2023-12-20 VITALS — WEIGHT: 190 LBS | HEIGHT: 71 IN | BODY MASS INDEX: 26.6 KG/M2

## 2023-12-20 DIAGNOSIS — L84 CORNS AND CALLOSITIES: ICD-10-CM

## 2023-12-20 PROCEDURE — 11042 DBRDMT SUBQ TIS 1ST 20SQCM/<: CPT

## 2023-12-20 PROCEDURE — 99213 OFFICE O/P EST LOW 20 MIN: CPT | Mod: 25

## 2023-12-20 NOTE — PROCEDURE
[FreeTextEntry1] : a sharp full thickness excisional debridement utilizing a scalpel tissue nipper as well as a curette into the subcutaneous tissue level was performed to sub 5 left. Bleeding was mild and was controlled with pressure. , discharge orders as well as wound care orders were reviewed with the patient and a follow up appointment given, Bactroban to be applied on a daily basis The remainder of the prehyperkeratotic lesions were offloaded with padding the orthotics were checked they are dispersing the weight off the preulcerative lesions so therefore the patient needs to be far more compliant regarding offloading and limiting weightbearing A lengthy discussion with the patient regarding diabetes and the manifestations second occur in the feet. The discussion included but was not limited to nail care, treatment of open wound, treatment of calluses, shoe gear, as well as statistics regarding diabetes as it relates to loss of toe, toes, midfoot, as well as mortality Re: diabetics wind up with a midfoot amputation, transmetatarsal amputation, as well as 50 percent of diabetics who suffer loss of the leg suffering mortality within 5 years. Educational literature was dispensed. Overall diabetic education as it regard to the foot was discussed with all questions asked and answered appropriately. I've advised the patient should there be any concerns regarding nails, infection, open wounds, interdigital problems, or acute injury that they should contact the office immediately. I have had a lengthy discussion with the patient regarding overall skincare. The importance of the type of socks, the type of shoes, and the type of overall foot hygiene is important to help control and prevent eruptions especially over extreme weather changes. This included but was not limited to hydration and lubrication, dove soap, triple rinse clothing and linens. I also explained the importance of thorough drying of both feet especially the web spaces. Given the extreme temperatures back in a car I also reviewed the type of shoes that would help reduce the chances of cracking of the skin especially leading to fissuring of the heels. Overall skincare precautions were reviewed education literature dispensed in the patient's questions asked and answered appropriately. follow up appt 2 weeks

## 2023-12-20 NOTE — PHYSICAL EXAM
[de-identified] : 5th ray resection left, right partial 2nd toe, right partial hallux amp [Foot Ulcer] : foot ulcer [FreeTextEntry1] : Left foot sub 5 is now 0.5 x 0.6 x 0.1 w/ 50 % fibrin and 50% GT..  no cellulitis, no tunneling, no sinus tract, no drainage, no odor, mild periwound erythema, no evidence of infection The evaluation of the right foot still has preulcerative areas but they are still hyperkeratotic lesions

## 2024-01-04 ENCOUNTER — APPOINTMENT (OUTPATIENT)
Dept: PODIATRY | Facility: CLINIC | Age: 49
End: 2024-01-04
Payer: MEDICAID

## 2024-01-04 PROCEDURE — 99213 OFFICE O/P EST LOW 20 MIN: CPT

## 2024-01-04 NOTE — PROCEDURE
[FreeTextEntry1] : I have had a lengthy discussion with the patient regarding overall skincare. The importance of the type of socks, the type of shoes, and the type of overall foot hygiene is important to help control and prevent eruptions especially over extreme weather changes. This included but was not limited to hydration and lubrication, dove soap, triple rinse clothing and linens. I also explained the importance of thorough drying of both feet especially the web spaces. Given the extreme temperatures back in a car I also reviewed the type of shoes that would help reduce the chances of cracking of the skin especially leading to fissuring of the heels. Overall skincare precautions were reviewed education literature dispensed in the patient's questions asked and answered appropriately. I lengthy discussion with the patient today regarding hygiene and foot health. My discussion to focus mainly on prevention of pathology and remaining proactive. My discussion included but was not limited to overall foot health, foot hygiene, the risks of walking barefoot especially in public places and the need to be conscious of the cleanliness of facilities where not only in a walk barefoot but other people walk barefoot as well. All questions were asked and answered appropriately and educational literature was dispensed I told him now that all his wounds are closed that he should be extra diligent to try and keep them closed A complete and thorough evaluation of the type of shoes they should be wearing and type of shoes for this time of year was discussed with patient.

## 2024-01-04 NOTE — PHYSICAL EXAM
[de-identified] : 5th ray resection left, right partial 2nd toe, right partial hallux amp [Skin Color & Pigmentation] : normal skin color and pigmentation [Skin Turgor] : normal skin turgor [] : no rash [Skin Lesions] : no skin lesions [Foot Ulcer] : no foot ulcer [Skin Induration] : no skin induration [FreeTextEntry1] : .  no cellulitis, no tunneling, no sinus tract, no drainage, no odor, mild periwound erythema, no evidence of infection The evaluation of the right foot still has preulcerative areas but they are still hyperkeratotic lesions

## 2024-01-18 ENCOUNTER — APPOINTMENT (OUTPATIENT)
Dept: PODIATRY | Facility: CLINIC | Age: 49
End: 2024-01-18
Payer: MEDICAID

## 2024-01-18 VITALS — HEIGHT: 71 IN | BODY MASS INDEX: 26.6 KG/M2 | WEIGHT: 190 LBS

## 2024-01-18 DIAGNOSIS — B35.1 TINEA UNGUIUM: ICD-10-CM

## 2024-01-18 PROCEDURE — 11721 DEBRIDE NAIL 6 OR MORE: CPT | Mod: 59

## 2024-01-18 PROCEDURE — 11056 PARNG/CUTG B9 HYPRKR LES 2-4: CPT

## 2024-01-18 PROCEDURE — 99213 OFFICE O/P EST LOW 20 MIN: CPT | Mod: 25

## 2024-01-18 NOTE — PHYSICAL EXAM
[Skin Color & Pigmentation] : normal skin color and pigmentation [Skin Turgor] : normal skin turgor [] : no rash [Skin Lesions] : no skin lesions [de-identified] : 5th ray resection left, right partial 2nd toe, right partial hallux amp [Foot Ulcer] : no foot ulcer [Skin Induration] : no skin induration [FreeTextEntry1] : no cellulitis, no tunneling, no sinus tract, no drainage, no odor, mild periwound erythema, no evidence of infection The evaluation of the right foot still has no evidence of pre ulcerative areas but they are still hyperkeratotic lesions and the area of the previous diabetic foot ulcer to the area below the fifth metatarsal head on the left foot now has a partial-thickness noninfected ulcer.

## 2024-01-18 NOTE — HISTORY OF PRESENT ILLNESS
[FreeTextEntry1] : DFU sub 5 left- currently using bactroban, preulcerative lesions and history of bilateral foot ulcers The patient presents for follow up of chronic and painful mycotic nail disease as well as painful kyperkeratoses. Past professional  tx's in the past in the presence of PAD have consisted of periodic debridements of both nails and painful keratoses which have offered significant relief in controlling of symptoms and the patient presents for follow up care.

## 2024-01-18 NOTE — PROCEDURE
[FreeTextEntry1] : A complete explanation regarding updated wound care discussed with patient and wound care sheet given to the patient and reviewed with them and they understand and state they are able to carry out.  Betadine daily to the area below the fifth metatarsal head on the left foot. The above-named patient presents for followup of diabetic foot care. The patient complains of ongoing thickened and painful nails and states they get relief from periodic debridement under sterile aseptic technique. The patient has no other presenting complaints. Therefore, under sharp sterile aseptic technique sharp debridement of all nails was performed with removal of any offending painful spicules. This was accomplished in order to make the toes and feet more comfortable to ambulate in conventional shoe gear. This was followed by electric burring of irregular and rough edges. In addition to the debridement of nails the patient had a lengthy discussion regarding overall skin care and discussion regarding the type of shoes they should be wearing. At the conclusion of the visit the patient stated they understood and all questions asked and answered appropriately and the patient stated relief of symptoms

## 2024-01-18 NOTE — REVIEW OF SYSTEMS
[As Noted in HPI] : as noted in HPI [Skin Wound] : skin wound [Negative] : Musculoskeletal [Leg Claudication] : no intermittent leg claudication [Lower Ext Edema] : no extremity edema

## 2024-02-08 ENCOUNTER — APPOINTMENT (OUTPATIENT)
Dept: PODIATRY | Facility: CLINIC | Age: 49
End: 2024-02-08
Payer: MEDICAID

## 2024-02-08 VITALS — HEIGHT: 71 IN | BODY MASS INDEX: 26.6 KG/M2 | WEIGHT: 190 LBS

## 2024-02-08 PROCEDURE — 99213 OFFICE O/P EST LOW 20 MIN: CPT | Mod: 25

## 2024-02-08 PROCEDURE — 73630 X-RAY EXAM OF FOOT: CPT | Mod: LT

## 2024-02-08 PROCEDURE — 11042 DBRDMT SUBQ TIS 1ST 20SQCM/<: CPT

## 2024-02-08 RX ORDER — AMOXICILLIN AND CLAVULANATE POTASSIUM 875; 125 MG/1; MG/1
875-125 TABLET, COATED ORAL
Qty: 28 | Refills: 0 | Status: ACTIVE | COMMUNITY
Start: 2024-02-08 | End: 1900-01-01

## 2024-02-08 NOTE — HISTORY OF PRESENT ILLNESS
[FreeTextEntry1] : DFU sub 5 left- currently using bactroban, Patient states he saw drainage and had a foul odor during the week

## 2024-02-08 NOTE — PHYSICAL EXAM
[de-identified] : 5th ray resection left, right partial 2nd toe, right partial hallux amp [Skin Color & Pigmentation] : normal skin color and pigmentation [Skin Turgor] : normal skin turgor [] : no rash [Foot Ulcer] : no foot ulcer [Skin Induration] : no skin induration [FreeTextEntry1] : no cellulitis, no tunneling, no sinus tract, mild odor with some drainage appreciated.  Also mild periwound erythema,

## 2024-02-08 NOTE — PROCEDURE
[FreeTextEntry1] : X-rays were taken in the office multiple views of the left foot simulating angle and base of gait Aside from the obvious bony resections done secondary to the amputation on the left foot there is no evidence as to why the patient is having ongoing problems with the area in question.  He has a stump filler as well as a custom molded orthotic.  There is no clinical indication of irregular bony growths or bony exostoses or spicules which could immediately be attributed to the ongoing chronic ulcer a sharp full thickness excisional debridement utilizing a scalpel tissue nipper as well as a curette into the subcutaneous tissue level was performed. Bleeding was mild and was controlled with pressure. , discharge orders as well as wound care orders were reviewed with the patient and a follow up appointment given, Bactroban to be applied on a daily basis I obtain a specimen for anaerobic Gram stain culture and sensitivity.  I also gave the patient offloading pads an Allevyn for 1 week with instructions on how to use it.

## 2024-02-08 NOTE — REVIEW OF SYSTEMS
[Leg Claudication] : no intermittent leg claudication [Lower Ext Edema] : no extremity edema [As Noted in HPI] : as noted in HPI [Skin Wound] : skin wound [Negative] : Musculoskeletal

## 2024-02-15 ENCOUNTER — APPOINTMENT (OUTPATIENT)
Dept: PODIATRY | Facility: CLINIC | Age: 49
End: 2024-02-15
Payer: MEDICAID

## 2024-02-15 VITALS — BODY MASS INDEX: 26.6 KG/M2 | HEIGHT: 71 IN | WEIGHT: 190 LBS

## 2024-02-15 LAB — BACTERIA WND CULT: ABNORMAL

## 2024-02-15 PROCEDURE — 99213 OFFICE O/P EST LOW 20 MIN: CPT

## 2024-02-15 NOTE — PROCEDURE
[FreeTextEntry1] : I removed his left orthotic and applied a offloading pad to it beneath the ulcer.  I have also reviewed the wound care instructions which would be Iodosorb on a daily basis.  I advised the patient to finish any remaining antibiotics and return to the office in 1 week

## 2024-02-15 NOTE — REVIEW OF SYSTEMS
[Leg Claudication] : no intermittent leg claudication [Lower Ext Edema] : no extremity edema [As Noted in HPI] : as noted in HPI [Skin Wound] : skin wound [Negative] : Constitutional

## 2024-02-15 NOTE — PHYSICAL EXAM
[de-identified] : 5th ray resection left, right partial 2nd toe, right partial hallux amp [Skin Color & Pigmentation] : normal skin color and pigmentation [Skin Turgor] : normal skin turgor [] : no rash [Foot Ulcer] : no foot ulcer [Skin Induration] : no skin induration [FreeTextEntry1] : no cellulitis, no tunneling, no sinus tract, mild odor with some drainage appreciated.  Also mild periwound erythema, Significant improvement noted there is no sign of infection no periwound callus and the wound now is 0.3 x 0.3 x 0.1 cm 100% granulation tissue

## 2024-02-22 ENCOUNTER — APPOINTMENT (OUTPATIENT)
Dept: PODIATRY | Facility: CLINIC | Age: 49
End: 2024-02-22
Payer: MEDICAID

## 2024-02-22 VITALS — BODY MASS INDEX: 26.6 KG/M2 | WEIGHT: 190 LBS | HEIGHT: 71 IN

## 2024-02-22 PROCEDURE — 99213 OFFICE O/P EST LOW 20 MIN: CPT

## 2024-02-22 NOTE — PHYSICAL EXAM
[de-identified] : 5th ray resection left, right partial 2nd toe, right partial hallux amp [Skin Color & Pigmentation] : normal skin color and pigmentation [Skin Turgor] : normal skin turgor [] : no rash [Foot Ulcer] : no foot ulcer [Skin Induration] : no skin induration [FreeTextEntry1] : no cellulitis, no tunneling, no sinus tract, mild odor with some drainage appreciated.  Also mild periwound erythema, Significant improvement noted there is no sign of infection no periwound callus and the wound now is still  0.3 x 0.3 x 0.1 cm 100% granulation tissue

## 2024-02-29 ENCOUNTER — APPOINTMENT (OUTPATIENT)
Dept: PODIATRY | Facility: CLINIC | Age: 49
End: 2024-02-29
Payer: MEDICAID

## 2024-02-29 VITALS — WEIGHT: 190 LBS | HEIGHT: 71 IN | BODY MASS INDEX: 26.6 KG/M2

## 2024-02-29 PROCEDURE — 99213 OFFICE O/P EST LOW 20 MIN: CPT

## 2024-02-29 NOTE — PROCEDURE
[FreeTextEntry1] : I have had a lengthy discussion with the patient regarding overall skincare. The importance of the type of socks, the type of shoes, and the type of overall foot hygiene is important to help control and prevent eruptions especially over extreme weather changes. This included but was not limited to hydration and lubrication, dove soap, triple rinse clothing and linens. I also explained the importance of thorough drying of both feet especially the web spaces. Given the extreme temperatures back in a car I also reviewed the type of shoes that would help reduce the chances of cracking of the skin especially leading to fissuring of the heels. Overall skincare precautions were reviewed education literature dispensed in the patient's questions asked and answered appropriately. A long discussion with the patient regarding the need for pressure relief and offloading and the importance of it regarding healing, and most importantly after healing prevention of recurrence. I explained some of the long term consequences if offloading is not adhered to. This can result in loss of toe, toes, foot, or below the knee amputation. If the patient is not compliant regarding offloading and pressure relief severe consequences and long-term deformities could result. Some options discussed were but not limited to customize orthotics, he can walk her with customized innersole, an adult-type scooter, and total contact casting. A new supply of dispersion pads were given and we agreed to switch to Silvadene cream. had a lengthy discussion with the patient regarding the diagnosis etiology and differential diagnosis as well as treatment options for the presenting problem. Risks alternatives and benefits of treatment ranging from conservative to surgical explained in great detail. I also explained the progression of treatment from conservative to possible surgical treatment options as well as the benefits of each. I do stress conservative treatment if in fact conservative treatment is an option until it no longer provides relief. Over-the-counter products medications padding, and splinting were reviewed as well. All questions asked and answered appropriately to the patient's satisfaction I did say perhaps it is time to consider surgical intervention however the patient declined at this time

## 2024-02-29 NOTE — PHYSICAL EXAM
[de-identified] : 5th ray resection left, right partial 2nd toe, right partial hallux amp [Skin Color & Pigmentation] : normal skin color and pigmentation [Skin Turgor] : normal skin turgor [] : no rash [Foot Ulcer] : no foot ulcer [Skin Induration] : no skin induration [FreeTextEntry1] : no cellulitis, no tunneling, no sinus tract, mild odor with some drainage appreciated.  Also mild periwound erythema, There is no significant change today in the overall dimensions of the wound.  There is no sign of infection no periwound callus and the wound now is still  0.3 x 0.3 x 0.1 cm 100% granulation tissue

## 2024-03-15 ENCOUNTER — APPOINTMENT (OUTPATIENT)
Dept: PODIATRY | Facility: CLINIC | Age: 49
End: 2024-03-15
Payer: MEDICAID

## 2024-03-15 PROCEDURE — 99213 OFFICE O/P EST LOW 20 MIN: CPT

## 2024-03-15 NOTE — PHYSICAL EXAM
[de-identified] : 5th ray resection left, right partial 2nd toe, right partial hallux amp [Skin Turgor] : normal skin turgor [Skin Color & Pigmentation] : normal skin color and pigmentation [] : no rash [Foot Ulcer] : no foot ulcer [Skin Induration] : no skin induration [FreeTextEntry1] : no cellulitis, no tunneling, no sinus tract, mild odor with some drainage appreciated.  Also mild periwound erythema, There is no significant change today in the overall dimensions of the wound.  There is no sign of infection no periwound callus and the wound now is still  0.2 x 0.2 x 0.1 cm 100% granulation tissue

## 2024-03-15 NOTE — REVIEW OF SYSTEMS
[Leg Claudication] : no intermittent leg claudication [Lower Ext Edema] : no extremity edema [Skin Wound] : skin wound [As Noted in HPI] : as noted in HPI [Negative] : Constitutional

## 2024-03-15 NOTE — PROCEDURE
[FreeTextEntry1] : I have advised the patient to continue offloading, continue to use his orthotics and I have given him some dispersion pads to apply around the wound.  We have come up with a plan to cycle his wound care products we will begin with mupirocin, we will then switch to Iodosorb after 1 week and then cycle back to Silvadene a week after that. follow up appt 2 weeks

## 2024-03-28 ENCOUNTER — APPOINTMENT (OUTPATIENT)
Dept: PODIATRY | Facility: CLINIC | Age: 49
End: 2024-03-28
Payer: MEDICAID

## 2024-03-28 VITALS
OXYGEN SATURATION: 97 % | DIASTOLIC BLOOD PRESSURE: 80 MMHG | SYSTOLIC BLOOD PRESSURE: 137 MMHG | HEIGHT: 71 IN | HEART RATE: 107 BPM | WEIGHT: 190 LBS | BODY MASS INDEX: 26.6 KG/M2

## 2024-03-28 PROCEDURE — 11042 DBRDMT SUBQ TIS 1ST 20SQCM/<: CPT

## 2024-03-28 PROCEDURE — 99213 OFFICE O/P EST LOW 20 MIN: CPT | Mod: 25

## 2024-03-28 NOTE — PROCEDURE
[FreeTextEntry1] : a sharp full thickness excisional debridement utilizing a scalpel tissue nipper as well as a curette into the subcutaneous tissue level was performed. Bleeding was mild and was controlled with pressure. , discharge orders as well as wound care orders were reviewed with the patient and a follow up appointment given, silvadene to be applied on a daily basis and a sx shoe dispensed. follow up appt 1 weeks

## 2024-03-28 NOTE — PHYSICAL EXAM
Ongoing SW/CM Assessment/Plan of Care Note     See SW/CM flowsheets for goals and other objective data.    Patient/Family discharge goal (s):      To dc to a skilled care facility with an overnight 24 hour care-give vs home with private duty care.         PT Recommendation:     Recommendation for Discharge: PT IL: Patient requires 24 hour nonskilled assistance to perform mobility and/or ADLs safely    OT Recommendation:     Recommendations for Discharge: OT IL: Patient requires 24 hour nonskilled assistance to perform mobility and/or ADLs safely(possibly [facility] pending PLOF, will update recommendations once PLOF confirmed)    SLP Recommendation:  Recommendations for Discharge: SLP: ST at next level of care pending improvements in dysphagia        Progress note:       I spoke with Jose Waters who is working with patient's wife on placement. He requested that all patient's medical information be sent to [facility]. He attempted to have a two way conversation with me and with patient's . I discussed that SW is not to discuss information with attorneys. He asked that I send patient's paperwork to his  as well. We discussed that we do not send out medical records that that they would need to go through the medical records department. RODGER will continue to follow up with [facility]. NOBLE, DIAMANTE 03/1521         [de-identified] : 5th ray resection left, right partial 2nd toe, right partial hallux amp [Skin Color & Pigmentation] : normal skin color and pigmentation [Skin Turgor] : normal skin turgor [] : no rash [Foot Ulcer] : no foot ulcer [Skin Induration] : no skin induration [FreeTextEntry1] : Upon inspection of the area below the fourth metatarsal head there is some drainage coming however it is without odor there is no cellulitis there is no robin pus it is serous in nature.  Utilizing a sterile 10 blade as well as a tissue nipper there is significant undermining and once all hyperkeratotic tissue and nonviable tissue was removed the ulcer is now approximately 2.3 cm in length 2 cm in width and 0.2 cm in depth.  Once debridement was done there was good healthy bleeding granulation tissue no sign of tissue necrosis no sinus tract no exposed tendon or bone.  There is no sign of cellulitis or infectious process

## 2024-03-28 NOTE — HISTORY OF PRESENT ILLNESS
[FreeTextEntry1] : DFU sub 5 left- currently using silvadene Patient states he has noticed some drainage coming from the area this past week

## 2024-04-04 ENCOUNTER — APPOINTMENT (OUTPATIENT)
Dept: PODIATRY | Facility: CLINIC | Age: 49
End: 2024-04-04
Payer: MEDICAID

## 2024-04-04 VITALS
TEMPERATURE: 97 F | BODY MASS INDEX: 28.06 KG/M2 | SYSTOLIC BLOOD PRESSURE: 134 MMHG | HEIGHT: 70.47 IN | DIASTOLIC BLOOD PRESSURE: 81 MMHG | WEIGHT: 198.25 LBS | HEART RATE: 71 BPM

## 2024-04-04 PROCEDURE — 99213 OFFICE O/P EST LOW 20 MIN: CPT

## 2024-04-04 NOTE — HISTORY OF PRESENT ILLNESS
[FreeTextEntry1] : DFU sub 4/5 left s/p extensive debridement, currently in sx shoe and using silvadene

## 2024-04-04 NOTE — PHYSICAL EXAM
[de-identified] : 5th ray resection left, right partial 2nd toe, right partial hallux amp [Skin Color & Pigmentation] : normal skin color and pigmentation [Skin Turgor] : normal skin turgor [] : no rash [Foot Ulcer] : no foot ulcer [Skin Induration] : no skin induration [FreeTextEntry1] : Upon inspection of the area below the fourth metatarsal head there is no longer drainage coming and it is without odor there is no cellulitis there is no robin pus.  Utilizing a sterile 10 blade all hyperkeratotic tissue and nonviable tissue was removed the ulcer is now approximately 2.0 cm in length 2 cm in width and 0.1 cm in depth.  50% GT and 50% Fibrin-adhered

## 2024-04-04 NOTE — PROCEDURE
[FreeTextEntry1] : Written aftercare and wound orders dispensed and r/w patient. they understand apply bactroban and santyl daily follow up appt 1 week A complete and thorough evaluation of the type of shoes they should be wearing and type of shoes for this time of year was discussed with patient.

## 2024-04-04 NOTE — REVIEW OF SYSTEMS
[Fever] : no fever [Chills] : no chills [Leg Claudication] : no intermittent leg claudication [Lower Ext Edema] : no extremity edema [As Noted in HPI] : as noted in HPI

## 2024-04-11 ENCOUNTER — APPOINTMENT (OUTPATIENT)
Dept: PODIATRY | Facility: CLINIC | Age: 49
End: 2024-04-11
Payer: MEDICAID

## 2024-04-11 PROCEDURE — 99213 OFFICE O/P EST LOW 20 MIN: CPT

## 2024-04-11 NOTE — HISTORY OF PRESENT ILLNESS
[FreeTextEntry1] : DFU sub left s/p extensive debridement, currently in sx shoe and using santyl/bactroban

## 2024-04-11 NOTE — PROCEDURE
[FreeTextEntry1] : Written aftercare and wound orders dispensed and r/w patient. they understand apply bactroban and santyl daily follow up appt 1 week Stay in Sx shoe

## 2024-04-11 NOTE — PHYSICAL EXAM
[de-identified] : 5th ray resection left, right partial 2nd toe, right partial hallux amp [Skin Color & Pigmentation] : normal skin color and pigmentation [Skin Turgor] : normal skin turgor [] : no rash [Foot Ulcer] : no foot ulcer [Skin Induration] : no skin induration [FreeTextEntry1] : improvement in all dimensions noted. there is no cellulitis there is no robin pus.  50% GT and 50% Fibrin-adhered

## 2024-04-17 ENCOUNTER — APPOINTMENT (OUTPATIENT)
Dept: PODIATRY | Facility: CLINIC | Age: 49
End: 2024-04-17
Payer: MEDICAID

## 2024-04-17 PROCEDURE — 11056 PARNG/CUTG B9 HYPRKR LES 2-4: CPT

## 2024-04-17 PROCEDURE — 99213 OFFICE O/P EST LOW 20 MIN: CPT | Mod: 25

## 2024-04-17 NOTE — PROCEDURE
[FreeTextEntry1] : Utilizing a scalpel and sterile aseptic technique sharp debridement of multiple hyperkeratotic lesions performed. Appropriate padding were necessary. A lengthy discussion with the patient regarding diabetes and the manifestations second occur in the feet. The discussion included but was not limited to nail care, treatment of open wound, treatment of calluses, shoe gear, as well as statistics regarding diabetes as it relates to loss of toe, toes, midfoot, as well as mortality Re: diabetics wind up with a midfoot amputation, transmetatarsal amputation, as well as 50 percent of diabetics who suffer loss of the leg suffering mortality within 5 years. Educational literature was dispensed. Overall diabetic education as it regard to the foot was discussed with all questions asked and answered appropriately. I've advised the patient should there be any concerns regarding nails, infection, open wounds, interdigital problems, or acute injury that they should contact the office immediately. Written aftercare and wound orders dispensed and r/w patient. they understand iodosorb daily follow up appt 2 week Stay in Sx shoe

## 2024-04-17 NOTE — HISTORY OF PRESENT ILLNESS
[FreeTextEntry1] : DFU sub left s/p extensive debridement, currently in sx shoe and using santyl/bactroban States increased pain to the left foot, but admits to doing extra standing recently Excessive calluses left and right foot distally

## 2024-04-17 NOTE — PHYSICAL EXAM
[de-identified] : 5th ray resection left, right partial 2nd toe, right partial hallux amp [Skin Color & Pigmentation] : normal skin color and pigmentation [Skin Turgor] : normal skin turgor [] : no rash [Foot Ulcer] : no foot ulcer [Skin Induration] : no skin induration [FreeTextEntry1] : The wound status appears slightly larger in overall dimensions, but there is a significant amount of surrounding hyperkeratosis to the wound itself, the distal aspect of the great toes bilateral in the distal aspect of the second digit left foot. The ulcer is 50% GT and 50% Fibrin-adhered

## 2024-05-02 ENCOUNTER — APPOINTMENT (OUTPATIENT)
Dept: PODIATRY | Facility: CLINIC | Age: 49
End: 2024-05-02
Payer: MEDICAID

## 2024-05-02 VITALS
HEIGHT: 70 IN | SYSTOLIC BLOOD PRESSURE: 131 MMHG | TEMPERATURE: 96.8 F | DIASTOLIC BLOOD PRESSURE: 83 MMHG | BODY MASS INDEX: 28.35 KG/M2 | WEIGHT: 198 LBS

## 2024-05-02 DIAGNOSIS — L97.522 NON-PRESSURE CHRONIC ULCER OF OTHER PART OF LEFT FOOT WITH FAT LAYER EXPOSED: ICD-10-CM

## 2024-05-02 PROCEDURE — G2211 COMPLEX E/M VISIT ADD ON: CPT | Mod: NC,1L

## 2024-05-02 PROCEDURE — 99213 OFFICE O/P EST LOW 20 MIN: CPT

## 2024-05-02 NOTE — HISTORY OF PRESENT ILLNESS
[FreeTextEntry1] : DFU sub left s/p extensive debridement, currently in sx shoe and using santyl/bactroban Says he still has pain to the left foot Using Iodosorb

## 2024-05-02 NOTE — PHYSICAL EXAM
[de-identified] : 5th ray resection left, right partial 2nd toe, right partial hallux amp [Skin Color & Pigmentation] : normal skin color and pigmentation [Skin Turgor] : normal skin turgor [] : no rash [Foot Ulcer] : no foot ulcer [Skin Induration] : no skin induration [FreeTextEntry1] : The wound status appears  signifivantly better. 0.4cm x 0.3 x 0.1 left foot. The ulcer is 100% GT

## 2024-05-02 NOTE — PROCEDURE
[FreeTextEntry1] :  Written aftercare and wound orders dispensed and r/w patient. they understand iodosorb daily follow up appt 2 week A complete and thorough evaluation of the type of shoes they should be wearing and type of shoes for this time of year was discussed with patient. A lengthy discussion with the patient regarding the current medication being prescribed, the dosage, frequency, and the need to be consistent with taking the medication at or about the same time every day. I also spent time reviewing the risks alternatives and benefits to the medication as well as the potential side effects. The decision was made to proceed with the medication ok to ween off sx shoe and return to sneaker w/ orthotic

## 2024-05-15 DIAGNOSIS — G89.4 CHRONIC PAIN SYNDROME: ICD-10-CM

## 2024-05-15 RX ORDER — KETOROLAC TROMETHAMINE 10 MG/1
10 TABLET, FILM COATED ORAL 3 TIMES DAILY
Qty: 30 | Refills: 0 | Status: ACTIVE | COMMUNITY
Start: 2024-05-15 | End: 1900-01-01

## 2024-05-23 ENCOUNTER — APPOINTMENT (OUTPATIENT)
Dept: PODIATRY | Facility: CLINIC | Age: 49
End: 2024-05-23
Payer: MEDICAID

## 2024-05-23 DIAGNOSIS — L97.522 NON-PRESSURE CHRONIC ULCER OF OTHER PART OF LEFT FOOT WITH FAT LAYER EXPOSED: ICD-10-CM

## 2024-05-23 PROCEDURE — 11056 PARNG/CUTG B9 HYPRKR LES 2-4: CPT

## 2024-05-23 PROCEDURE — 99213 OFFICE O/P EST LOW 20 MIN: CPT | Mod: 25

## 2024-05-23 NOTE — PROCEDURE
[FreeTextEntry1] : Utilizing a scalpel and sterile aseptic technique sharp debridement of multiple hyperkeratotic lesions performed. Appropriate padding were necessary. Written aftercare and wound orders dispensed and r/w patient. they understand iodosorb daily follow up appt 3 week A complete and thorough evaluation of the type of shoes they should be wearing and type of shoes for this time of year was discussed with patient. A lengthy discussion with the patient regarding the current medication being prescribed, the dosage, frequency, and the need to be consistent with taking the medication at or about the same time every day. I also spent time reviewing the risks alternatives and benefits to the medication as well as the potential side effects. The decision was made to proceed with the medication ok to ween off sx shoe and return to sneaker w/ orthotic

## 2024-05-23 NOTE — PHYSICAL EXAM
[de-identified] : 5th ray resection left, right partial 2nd toe, right partial hallux amp [Skin Color & Pigmentation] : normal skin color and pigmentation [Skin Turgor] : normal skin turgor [] : no rash [Foot Ulcer] : no foot ulcer [Skin Induration] : no skin induration [FreeTextEntry1] : The wound status appears  basically unchanged 0.3cm x 0.3 x 0.1 left foot. The ulcer is 100% GT  Painful hyperkeratotic lesions noted sub 1 right, left, surrounding the ulcer left

## 2024-05-23 NOTE — HISTORY OF PRESENT ILLNESS
[FreeTextEntry1] : DFU sub left s/p extensive debridement, currently in sx shoe and using santyl/bactroban Says he still has pain to the left foot Using Iodosorb also requests debridement of multiple HK lesions (pre ulcerative sub 1 right)

## 2024-06-13 ENCOUNTER — APPOINTMENT (OUTPATIENT)
Dept: PODIATRY | Facility: CLINIC | Age: 49
End: 2024-06-13
Payer: MEDICAID

## 2024-06-13 DIAGNOSIS — L85.1 ACQUIRED KERATOSIS [KERATODERMA] PALMARIS ET PLANTARIS: ICD-10-CM

## 2024-06-13 DIAGNOSIS — Z89.411 ACQUIRED ABSENCE OF RIGHT GREAT TOE: ICD-10-CM

## 2024-06-13 DIAGNOSIS — L89.896 PRESSURE-INDUCED DEEP TISSUE DAMAGE OF OTHER SITE: ICD-10-CM

## 2024-06-13 PROCEDURE — 99213 OFFICE O/P EST LOW 20 MIN: CPT | Mod: 25

## 2024-06-13 PROCEDURE — 11057 PARNG/CUTG B9 HYPRKR LES >4: CPT

## 2024-06-13 NOTE — PROCEDURE
[FreeTextEntry1] : Utilizing a scalpel and sterile aseptic technique sharp debridement of multiple hyperkeratotic lesions performed. Appropriate padding were necessary. Written aftercare and wound orders dispensed and r/w patient. they understand iodosorb daily follow up appt 2 weeks Once again I reiterated to the patient that I believe at this time being proactive in addressing the etiology of the ulcer which is the fourth metatarsal head be addressed and consider fourth metatarsal head resection

## 2024-06-13 NOTE — HISTORY OF PRESENT ILLNESS
[FreeTextEntry1] : DFU sub left s/p extensive debridement, currently in sx shoe and using santyl/bactroban Says he still has pain to the left foot Using Iodosorb also requests debridement of multiple HK lesions

## 2024-06-13 NOTE — PHYSICAL EXAM
[de-identified] : 5th ray resection left, right partial 2nd toe, right partial hallux amp [Skin Color & Pigmentation] : normal skin color and pigmentation [Skin Turgor] : normal skin turgor [] : no rash [Foot Ulcer] : no foot ulcer [Skin Induration] : no skin induration [FreeTextEntry1] : The wound status appears  basically unchanged 0.5cm x 0.4 x 0.2 left foot. The ulcer is 100% GT  Painful hyperkeratotic lesions noted sub 1, 3, 5 right, left sun 1 and plantarlysurrounding the ulcer left

## 2024-06-27 ENCOUNTER — APPOINTMENT (OUTPATIENT)
Dept: PODIATRY | Facility: CLINIC | Age: 49
End: 2024-06-27
Payer: MEDICAID

## 2024-06-27 DIAGNOSIS — L97.521 NON-PRESSURE CHRONIC ULCER OF OTHER PART OF LEFT FOOT LIMITED TO BREAKDOWN OF SKIN: ICD-10-CM

## 2024-06-27 DIAGNOSIS — E11.621 TYPE 2 DIABETES MELLITUS WITH FOOT ULCER: ICD-10-CM

## 2024-06-27 DIAGNOSIS — E11.42 TYPE 2 DIABETES MELLITUS WITH DIABETIC POLYNEUROPATHY: ICD-10-CM

## 2024-06-27 DIAGNOSIS — L97.509 TYPE 2 DIABETES MELLITUS WITH FOOT ULCER: ICD-10-CM

## 2024-06-27 PROCEDURE — 99213 OFFICE O/P EST LOW 20 MIN: CPT

## 2024-07-18 ENCOUNTER — APPOINTMENT (OUTPATIENT)
Dept: PODIATRY | Facility: CLINIC | Age: 49
End: 2024-07-18
Payer: MEDICAID

## 2024-07-18 VITALS
BODY MASS INDEX: 28.2 KG/M2 | TEMPERATURE: 96.9 F | SYSTOLIC BLOOD PRESSURE: 125 MMHG | HEIGHT: 70 IN | WEIGHT: 197 LBS | DIASTOLIC BLOOD PRESSURE: 75 MMHG

## 2024-07-18 DIAGNOSIS — L85.1 ACQUIRED KERATOSIS [KERATODERMA] PALMARIS ET PLANTARIS: ICD-10-CM

## 2024-07-18 DIAGNOSIS — B35.1 TINEA UNGUIUM: ICD-10-CM

## 2024-07-18 DIAGNOSIS — E11.42 TYPE 2 DIABETES MELLITUS WITH DIABETIC POLYNEUROPATHY: ICD-10-CM

## 2024-07-18 PROCEDURE — 11721 DEBRIDE NAIL 6 OR MORE: CPT | Mod: 59

## 2024-07-18 PROCEDURE — 11057 PARNG/CUTG B9 HYPRKR LES >4: CPT

## 2024-08-08 ENCOUNTER — APPOINTMENT (OUTPATIENT)
Dept: PODIATRY | Facility: CLINIC | Age: 49
End: 2024-08-08

## 2024-08-08 PROCEDURE — 11056 PARNG/CUTG B9 HYPRKR LES 2-4: CPT

## 2024-08-08 PROCEDURE — 11721 DEBRIDE NAIL 6 OR MORE: CPT | Mod: 59

## 2024-08-08 NOTE — PHYSICAL EXAM
[de-identified] : 5th ray resection left, right partial 2nd toe, right partial hallux amp [Skin Color & Pigmentation] : normal skin color and pigmentation [Skin Turgor] : normal skin turgor [] : no rash [Foot Ulcer] : no foot ulcer [Skin Induration] : no skin induration [FreeTextEntry1] : The patient has all contributing factors to onychomycosis including but not limited to thickness, subungual debris, discoloration and partial lysis and they are brittle when cut. Painful hyperkeratotic lesions noted sub 4 left, sub 1 both, pinch right

## 2024-08-08 NOTE — PROCEDURE
[FreeTextEntry1] : The above-named patient presents for followup of diabetic foot care. The patient complains of ongoing thickened and painful nails and states they get relief from periodic debridement under sterile aseptic technique. The patient has no other presenting complaints. Therefore, under sharp sterile aseptic technique sharp debridement of all nails was performed with removal of any offending painful spicules. This was accomplished in order to make the toes and feet more comfortable to ambulate in conventional shoe gear. This was followed by electric burring of irregular and rough edges. In addition to the debridement of nails the patient had a lengthy discussion regarding overall skin care and discussion regarding the type of shoes they should be wearing. At the conclusion of the visit the patient stated they understood and all questions asked and answered appropriately and the patient stated relief of symptoms Utilizing a scalpel and sterile aseptic technique sharp debridement of multiple hyperkeratotic lesions ( more than 4) was performed. Appropriate padding applied were necessary.

## 2024-08-08 NOTE — HISTORY OF PRESENT ILLNESS
[FreeTextEntry1] : The patient presents for follow up of chronic and painful mycotic nail disease as well as painful kyperkeratoses. Past professional  tx's in the past in the presence of DM (and multiple amps)  have consisted of periodic debridements of both nails and painful keratoses which have offered significant relief in controlling of symptoms and the patient presents for follow up care.

## 2024-08-16 ENCOUNTER — APPOINTMENT (OUTPATIENT)
Dept: PODIATRY | Facility: CLINIC | Age: 49
End: 2024-08-16
Payer: MEDICAID

## 2024-08-16 DIAGNOSIS — L97.522 NON-PRESSURE CHRONIC ULCER OF OTHER PART OF LEFT FOOT WITH FAT LAYER EXPOSED: ICD-10-CM

## 2024-08-16 DIAGNOSIS — E11.621 TYPE 2 DIABETES MELLITUS WITH FOOT ULCER: ICD-10-CM

## 2024-08-16 DIAGNOSIS — L97.509 TYPE 2 DIABETES MELLITUS WITH FOOT ULCER: ICD-10-CM

## 2024-08-16 PROCEDURE — 10061 I&D ABSCESS COMP/MULTIPLE: CPT

## 2024-08-16 PROCEDURE — 99214 OFFICE O/P EST MOD 30 MIN: CPT | Mod: 25

## 2024-08-16 RX ORDER — SULFAMETHOXAZOLE AND TRIMETHOPRIM 800; 160 MG/1; MG/1
800-160 TABLET ORAL TWICE DAILY
Qty: 20 | Refills: 1 | Status: ACTIVE | COMMUNITY
Start: 2024-08-16 | End: 1900-01-01

## 2024-08-16 NOTE — PROCEDURE
[FreeTextEntry1] : Subjective: - Summary : The patient presents on an emergent basis for a possible worsening problem on the plantar aspect of his left foot. The patient identified the issue couple of days ago but is unsure of the etiology. The patient denies walking barefoot, any injury or trauma, and using new footwear. - Chief Complaint (CC) : Potential worsening of a diabetic foot ulcer - History of Present Illness (HPI) : The patient noticed a change in his foot a couple of days ago. He denies any new injuries or changes in footwear. He has a known history of a diabetic foot ulcer on the plantar aspect of his left foot. - Past Medical History : Patient's medical history reveals a history of diabetic foot ulcer. - Past Surgical History : The patient had a previous fifth ray resection. - Family History : Not discussed - Social History : Not discussed - Review of Systems : No new findings to update the medical record. - Medications : Not specified - Allergies : Not specified Objective: - Diagnostic Results : Not specified - Vital Signs : Not specified - Physical Examination (PE) : Patient has a fluctuant abscess to the plantar lateral aspect of the left foot below the fourth metatarsal head. The area has surrounding erythema and slight warmth, and a significant raised fluctuant lesion measuring approximately 3 cm in width and 1.5 cm in length. Assessment: - Summary : Patient presents with a worsening diabetic foot ulcer which has formed a fluctuant abscess on the plantar lateral of the left foot. - Problems : - Diabetic Foot Ulcer left foot  - Abscess  Plan: - Summary : Performed incision and drainage under sterile aseptic technique, took deep tissue for gram strain culture and sensitivity. Dressing applied with silvadene. Patient advised to maintain cleanliness, keep area dry, covered and use mupiracin. Bactrim DS prescribed for bi-daily usage, with a follow-up planned in approximately five days. - Plan : - Incision and Drainage of abscess  - Prescription for Bactrim DS  - Follow-up in 5 days  greater than 30 minutes spent with patient

## 2024-08-20 ENCOUNTER — APPOINTMENT (OUTPATIENT)
Dept: PODIATRY | Facility: CLINIC | Age: 49
End: 2024-08-20
Payer: MEDICAID

## 2024-08-20 DIAGNOSIS — L02.612 CUTANEOUS ABSCESS OF LEFT FOOT: ICD-10-CM

## 2024-08-20 PROCEDURE — 99213 OFFICE O/P EST LOW 20 MIN: CPT | Mod: 24

## 2024-08-20 NOTE — PHYSICAL EXAM
[General Appearance - Alert] : alert [General Appearance - In No Acute Distress] : in no acute distress [de-identified] : s/p 5th ray resection left [FreeTextEntry1] : Significant improvement noted to the area on the plantar lateral aspect of the left forefoot.  The wound is approximately 50% improved and no sign of infection at this time no cellulitis, no tunneling, no sinus tract, no drainage, no odor, mild periwound erythema, no evidence of infection

## 2024-08-20 NOTE — PROCEDURE
[FreeTextEntry1] : Written aftercare and wound orders dispensed and r/w patient. they understand Remain insx shoe follow up appt 1 week

## 2024-08-22 LAB — BACTERIA TISS CULT: ABNORMAL

## 2024-08-29 ENCOUNTER — APPOINTMENT (OUTPATIENT)
Dept: PODIATRY | Facility: CLINIC | Age: 49
End: 2024-08-29

## 2024-08-29 VITALS
SYSTOLIC BLOOD PRESSURE: 120 MMHG | HEIGHT: 70 IN | TEMPERATURE: 96.8 F | DIASTOLIC BLOOD PRESSURE: 75 MMHG | WEIGHT: 197 LBS | BODY MASS INDEX: 28.2 KG/M2

## 2024-08-29 DIAGNOSIS — L97.521 NON-PRESSURE CHRONIC ULCER OF OTHER PART OF LEFT FOOT LIMITED TO BREAKDOWN OF SKIN: ICD-10-CM

## 2024-08-29 DIAGNOSIS — E11.42 TYPE 2 DIABETES MELLITUS WITH DIABETIC POLYNEUROPATHY: ICD-10-CM

## 2024-08-29 PROCEDURE — 99213 OFFICE O/P EST LOW 20 MIN: CPT

## 2024-08-29 NOTE — PROCEDURE
[FreeTextEntry1] : Written aftercare and wound orders dispensed and r/w patient. they understand Betadine daily for 3-5 days, then general skin precautions, sneakers and orthotics w/ stump filler follow up appt 3 weeks

## 2024-08-29 NOTE — HISTORY OF PRESENT ILLNESS
[FreeTextEntry1] : Location- sub 3-4 left Duration-a week Past tx-I and D, topical Abx, Cx, iodosorb

## 2024-08-29 NOTE — PHYSICAL EXAM
[General Appearance - Alert] : alert [General Appearance - In No Acute Distress] : in no acute distress [de-identified] : s/p 5th ray resection left [Skin Color & Pigmentation] : normal skin color and pigmentation [Skin Turgor] : normal skin turgor [] : no rash [Skin Lesions] : no skin lesions [Foot Ulcer] : no foot ulcer [Skin Induration] : no skin induration

## 2024-10-10 ENCOUNTER — APPOINTMENT (OUTPATIENT)
Dept: PODIATRY | Facility: CLINIC | Age: 49
End: 2024-10-10
Payer: COMMERCIAL

## 2024-10-10 DIAGNOSIS — L97.509 TYPE 2 DIABETES MELLITUS WITH FOOT ULCER: ICD-10-CM

## 2024-10-10 DIAGNOSIS — L97.521 NON-PRESSURE CHRONIC ULCER OF OTHER PART OF LEFT FOOT LIMITED TO BREAKDOWN OF SKIN: ICD-10-CM

## 2024-10-10 DIAGNOSIS — E11.621 TYPE 2 DIABETES MELLITUS WITH FOOT ULCER: ICD-10-CM

## 2024-10-10 DIAGNOSIS — E11.42 TYPE 2 DIABETES MELLITUS WITH DIABETIC POLYNEUROPATHY: ICD-10-CM

## 2024-10-10 PROCEDURE — 99213 OFFICE O/P EST LOW 20 MIN: CPT | Mod: 25

## 2024-10-10 PROCEDURE — 97597 DBRDMT OPN WND 1ST 20 CM/<: CPT

## 2024-10-24 ENCOUNTER — APPOINTMENT (OUTPATIENT)
Dept: PODIATRY | Facility: CLINIC | Age: 49
End: 2024-10-24
Payer: COMMERCIAL

## 2024-10-24 DIAGNOSIS — E11.42 TYPE 2 DIABETES MELLITUS WITH DIABETIC POLYNEUROPATHY: ICD-10-CM

## 2024-10-24 DIAGNOSIS — E11.621 TYPE 2 DIABETES MELLITUS WITH FOOT ULCER: ICD-10-CM

## 2024-10-24 DIAGNOSIS — L97.509 TYPE 2 DIABETES MELLITUS WITH FOOT ULCER: ICD-10-CM

## 2024-10-24 DIAGNOSIS — L97.521 NON-PRESSURE CHRONIC ULCER OF OTHER PART OF LEFT FOOT LIMITED TO BREAKDOWN OF SKIN: ICD-10-CM

## 2024-10-24 PROCEDURE — 99213 OFFICE O/P EST LOW 20 MIN: CPT

## 2024-11-14 ENCOUNTER — APPOINTMENT (OUTPATIENT)
Dept: PODIATRY | Facility: CLINIC | Age: 49
End: 2024-11-14
Payer: COMMERCIAL

## 2024-11-14 DIAGNOSIS — L97.522 NON-PRESSURE CHRONIC ULCER OF OTHER PART OF LEFT FOOT WITH FAT LAYER EXPOSED: ICD-10-CM

## 2024-11-14 DIAGNOSIS — E11.621 TYPE 2 DIABETES MELLITUS WITH FOOT ULCER: ICD-10-CM

## 2024-11-14 DIAGNOSIS — E11.42 TYPE 2 DIABETES MELLITUS WITH DIABETIC POLYNEUROPATHY: ICD-10-CM

## 2024-11-14 DIAGNOSIS — L97.509 TYPE 2 DIABETES MELLITUS WITH FOOT ULCER: ICD-10-CM

## 2024-11-14 PROCEDURE — 99213 OFFICE O/P EST LOW 20 MIN: CPT | Mod: 25

## 2024-11-14 PROCEDURE — 11721 DEBRIDE NAIL 6 OR MORE: CPT | Mod: 59

## 2024-11-14 PROCEDURE — 11057 PARNG/CUTG B9 HYPRKR LES >4: CPT

## 2024-12-05 ENCOUNTER — APPOINTMENT (OUTPATIENT)
Dept: PODIATRY | Facility: CLINIC | Age: 49
End: 2024-12-05
Payer: COMMERCIAL

## 2024-12-05 DIAGNOSIS — L85.1 ACQUIRED KERATOSIS [KERATODERMA] PALMARIS ET PLANTARIS: ICD-10-CM

## 2024-12-05 DIAGNOSIS — E11.42 TYPE 2 DIABETES MELLITUS WITH DIABETIC POLYNEUROPATHY: ICD-10-CM

## 2024-12-05 DIAGNOSIS — L97.522 NON-PRESSURE CHRONIC ULCER OF OTHER PART OF LEFT FOOT WITH FAT LAYER EXPOSED: ICD-10-CM

## 2024-12-05 PROCEDURE — 99213 OFFICE O/P EST LOW 20 MIN: CPT | Mod: 25

## 2024-12-05 PROCEDURE — 11056 PARNG/CUTG B9 HYPRKR LES 2-4: CPT

## 2025-01-03 ENCOUNTER — APPOINTMENT (OUTPATIENT)
Dept: PODIATRY | Facility: CLINIC | Age: 50
End: 2025-01-03
Payer: COMMERCIAL

## 2025-01-03 DIAGNOSIS — L84 CORNS AND CALLOSITIES: ICD-10-CM

## 2025-01-03 DIAGNOSIS — L85.1 ACQUIRED KERATOSIS [KERATODERMA] PALMARIS ET PLANTARIS: ICD-10-CM

## 2025-01-03 DIAGNOSIS — E11.42 TYPE 2 DIABETES MELLITUS WITH DIABETIC POLYNEUROPATHY: ICD-10-CM

## 2025-01-03 DIAGNOSIS — E11.621 TYPE 2 DIABETES MELLITUS WITH FOOT ULCER: ICD-10-CM

## 2025-01-03 DIAGNOSIS — L97.522 NON-PRESSURE CHRONIC ULCER OF OTHER PART OF LEFT FOOT WITH FAT LAYER EXPOSED: ICD-10-CM

## 2025-01-03 DIAGNOSIS — B35.1 TINEA UNGUIUM: ICD-10-CM

## 2025-01-03 DIAGNOSIS — L97.509 TYPE 2 DIABETES MELLITUS WITH FOOT ULCER: ICD-10-CM

## 2025-01-03 PROCEDURE — 11057 PARNG/CUTG B9 HYPRKR LES >4: CPT

## 2025-01-03 PROCEDURE — 11042 DBRDMT SUBQ TIS 1ST 20SQCM/<: CPT | Mod: LT

## 2025-01-03 PROCEDURE — 11721 DEBRIDE NAIL 6 OR MORE: CPT | Mod: 59

## 2025-01-03 PROCEDURE — 99213 OFFICE O/P EST LOW 20 MIN: CPT | Mod: 25

## 2025-01-07 DIAGNOSIS — L02.612 CUTANEOUS ABSCESS OF LEFT FOOT: ICD-10-CM

## 2025-01-07 RX ORDER — AMOXICILLIN AND CLAVULANATE POTASSIUM 875; 125 MG/1; MG/1
875-125 TABLET, COATED ORAL
Qty: 20 | Refills: 0 | Status: ACTIVE | COMMUNITY
Start: 2025-01-07 | End: 1900-01-01

## 2025-01-09 LAB — BACTERIA TISS CULT: ABNORMAL

## 2025-01-21 ENCOUNTER — APPOINTMENT (OUTPATIENT)
Dept: PODIATRY | Facility: CLINIC | Age: 50
End: 2025-01-21
Payer: COMMERCIAL

## 2025-01-21 DIAGNOSIS — E11.42 TYPE 2 DIABETES MELLITUS WITH DIABETIC POLYNEUROPATHY: ICD-10-CM

## 2025-01-21 DIAGNOSIS — E11.621 TYPE 2 DIABETES MELLITUS WITH FOOT ULCER: ICD-10-CM

## 2025-01-21 DIAGNOSIS — L97.509 TYPE 2 DIABETES MELLITUS WITH FOOT ULCER: ICD-10-CM

## 2025-01-21 DIAGNOSIS — L84 CORNS AND CALLOSITIES: ICD-10-CM

## 2025-01-21 DIAGNOSIS — L97.521 NON-PRESSURE CHRONIC ULCER OF OTHER PART OF LEFT FOOT LIMITED TO BREAKDOWN OF SKIN: ICD-10-CM

## 2025-01-21 PROCEDURE — 99213 OFFICE O/P EST LOW 20 MIN: CPT

## 2025-01-21 RX ORDER — AMMONIUM LACTATE 12 %
12 CREAM (GRAM) TOPICAL TWICE DAILY
Qty: 1 | Refills: 5 | Status: ACTIVE | COMMUNITY
Start: 2025-01-21 | End: 1900-01-01

## 2025-02-05 ENCOUNTER — APPOINTMENT (OUTPATIENT)
Dept: PODIATRY | Facility: CLINIC | Age: 50
End: 2025-02-05
Payer: COMMERCIAL

## 2025-02-05 DIAGNOSIS — E11.621 TYPE 2 DIABETES MELLITUS WITH FOOT ULCER: ICD-10-CM

## 2025-02-05 DIAGNOSIS — L97.509 TYPE 2 DIABETES MELLITUS WITH FOOT ULCER: ICD-10-CM

## 2025-02-05 DIAGNOSIS — L85.1 ACQUIRED KERATOSIS [KERATODERMA] PALMARIS ET PLANTARIS: ICD-10-CM

## 2025-02-05 DIAGNOSIS — E11.42 TYPE 2 DIABETES MELLITUS WITH DIABETIC POLYNEUROPATHY: ICD-10-CM

## 2025-02-05 DIAGNOSIS — L97.511 NON-PRESSURE CHRONIC ULCER OF OTHER PART OF RIGHT FOOT LIMITED TO BREAKDOWN OF SKIN: ICD-10-CM

## 2025-02-05 DIAGNOSIS — B35.1 TINEA UNGUIUM: ICD-10-CM

## 2025-02-05 PROCEDURE — 11057 PARNG/CUTG B9 HYPRKR LES >4: CPT

## 2025-02-05 PROCEDURE — 11721 DEBRIDE NAIL 6 OR MORE: CPT | Mod: 59

## 2025-02-05 PROCEDURE — 99213 OFFICE O/P EST LOW 20 MIN: CPT | Mod: 25

## 2025-02-24 ENCOUNTER — APPOINTMENT (OUTPATIENT)
Dept: PODIATRY | Facility: CLINIC | Age: 50
End: 2025-02-24
Payer: COMMERCIAL

## 2025-02-24 DIAGNOSIS — L97.509 TYPE 2 DIABETES MELLITUS WITH FOOT ULCER: ICD-10-CM

## 2025-02-24 DIAGNOSIS — L85.1 ACQUIRED KERATOSIS [KERATODERMA] PALMARIS ET PLANTARIS: ICD-10-CM

## 2025-02-24 DIAGNOSIS — L97.511 NON-PRESSURE CHRONIC ULCER OF OTHER PART OF RIGHT FOOT LIMITED TO BREAKDOWN OF SKIN: ICD-10-CM

## 2025-02-24 DIAGNOSIS — L97.522 NON-PRESSURE CHRONIC ULCER OF OTHER PART OF LEFT FOOT WITH FAT LAYER EXPOSED: ICD-10-CM

## 2025-02-24 DIAGNOSIS — E11.621 TYPE 2 DIABETES MELLITUS WITH FOOT ULCER: ICD-10-CM

## 2025-02-24 DIAGNOSIS — E11.42 TYPE 2 DIABETES MELLITUS WITH DIABETIC POLYNEUROPATHY: ICD-10-CM

## 2025-02-24 PROCEDURE — 11056 PARNG/CUTG B9 HYPRKR LES 2-4: CPT

## 2025-02-24 PROCEDURE — 99213 OFFICE O/P EST LOW 20 MIN: CPT | Mod: 25

## 2025-03-20 ENCOUNTER — APPOINTMENT (OUTPATIENT)
Dept: PODIATRY | Facility: CLINIC | Age: 50
End: 2025-03-20
Payer: COMMERCIAL

## 2025-03-20 DIAGNOSIS — E11.621 TYPE 2 DIABETES MELLITUS WITH FOOT ULCER: ICD-10-CM

## 2025-03-20 DIAGNOSIS — L97.521 NON-PRESSURE CHRONIC ULCER OF OTHER PART OF LEFT FOOT LIMITED TO BREAKDOWN OF SKIN: ICD-10-CM

## 2025-03-20 DIAGNOSIS — L97.509 TYPE 2 DIABETES MELLITUS WITH FOOT ULCER: ICD-10-CM

## 2025-03-20 DIAGNOSIS — E11.42 TYPE 2 DIABETES MELLITUS WITH DIABETIC POLYNEUROPATHY: ICD-10-CM

## 2025-03-20 PROCEDURE — 99213 OFFICE O/P EST LOW 20 MIN: CPT

## 2025-04-03 ENCOUNTER — APPOINTMENT (OUTPATIENT)
Dept: PODIATRY | Facility: CLINIC | Age: 50
End: 2025-04-03
Payer: COMMERCIAL

## 2025-04-03 DIAGNOSIS — L85.1 ACQUIRED KERATOSIS [KERATODERMA] PALMARIS ET PLANTARIS: ICD-10-CM

## 2025-04-03 DIAGNOSIS — L89.896 PRESSURE-INDUCED DEEP TISSUE DAMAGE OF OTHER SITE: ICD-10-CM

## 2025-04-03 DIAGNOSIS — Z89.432 ACQUIRED ABSENCE OF LEFT FOOT: ICD-10-CM

## 2025-04-03 DIAGNOSIS — L84 CORNS AND CALLOSITIES: ICD-10-CM

## 2025-04-03 DIAGNOSIS — E11.42 TYPE 2 DIABETES MELLITUS WITH DIABETIC POLYNEUROPATHY: ICD-10-CM

## 2025-04-03 PROCEDURE — 11057 PARNG/CUTG B9 HYPRKR LES >4: CPT

## 2025-04-03 PROCEDURE — 99213 OFFICE O/P EST LOW 20 MIN: CPT | Mod: 25

## 2025-04-24 ENCOUNTER — APPOINTMENT (OUTPATIENT)
Dept: PODIATRY | Facility: CLINIC | Age: 50
End: 2025-04-24
Payer: COMMERCIAL

## 2025-04-24 DIAGNOSIS — M79.2 NEURALGIA AND NEURITIS, UNSPECIFIED: ICD-10-CM

## 2025-04-24 DIAGNOSIS — B35.1 TINEA UNGUIUM: ICD-10-CM

## 2025-04-24 DIAGNOSIS — L85.1 ACQUIRED KERATOSIS [KERATODERMA] PALMARIS ET PLANTARIS: ICD-10-CM

## 2025-04-24 DIAGNOSIS — L84 CORNS AND CALLOSITIES: ICD-10-CM

## 2025-04-24 PROCEDURE — 11057 PARNG/CUTG B9 HYPRKR LES >4: CPT

## 2025-04-24 PROCEDURE — 11721 DEBRIDE NAIL 6 OR MORE: CPT | Mod: 59

## 2025-05-22 ENCOUNTER — APPOINTMENT (OUTPATIENT)
Dept: PODIATRY | Facility: CLINIC | Age: 50
End: 2025-05-22
Payer: COMMERCIAL

## 2025-05-22 DIAGNOSIS — L97.521 NON-PRESSURE CHRONIC ULCER OF OTHER PART OF LEFT FOOT LIMITED TO BREAKDOWN OF SKIN: ICD-10-CM

## 2025-05-22 DIAGNOSIS — L97.509 TYPE 2 DIABETES MELLITUS WITH FOOT ULCER: ICD-10-CM

## 2025-05-22 DIAGNOSIS — E11.621 TYPE 2 DIABETES MELLITUS WITH FOOT ULCER: ICD-10-CM

## 2025-05-22 DIAGNOSIS — L85.1 ACQUIRED KERATOSIS [KERATODERMA] PALMARIS ET PLANTARIS: ICD-10-CM

## 2025-05-22 DIAGNOSIS — B35.1 TINEA UNGUIUM: ICD-10-CM

## 2025-05-22 DIAGNOSIS — Z89.411 ACQUIRED ABSENCE OF RIGHT GREAT TOE: ICD-10-CM

## 2025-05-22 DIAGNOSIS — Z89.432 ACQUIRED ABSENCE OF LEFT FOOT: ICD-10-CM

## 2025-05-22 DIAGNOSIS — E11.42 TYPE 2 DIABETES MELLITUS WITH DIABETIC POLYNEUROPATHY: ICD-10-CM

## 2025-05-22 PROCEDURE — 11721 DEBRIDE NAIL 6 OR MORE: CPT | Mod: 59

## 2025-05-22 PROCEDURE — 99213 OFFICE O/P EST LOW 20 MIN: CPT | Mod: 25

## 2025-05-22 PROCEDURE — 97597 DBRDMT OPN WND 1ST 20 CM/<: CPT | Mod: LT

## 2025-05-22 PROCEDURE — 11057 PARNG/CUTG B9 HYPRKR LES >4: CPT

## 2025-06-05 ENCOUNTER — APPOINTMENT (OUTPATIENT)
Dept: PODIATRY | Facility: CLINIC | Age: 50
End: 2025-06-05
Payer: COMMERCIAL

## 2025-06-05 DIAGNOSIS — L97.521 NON-PRESSURE CHRONIC ULCER OF OTHER PART OF LEFT FOOT LIMITED TO BREAKDOWN OF SKIN: ICD-10-CM

## 2025-06-05 PROCEDURE — 99213 OFFICE O/P EST LOW 20 MIN: CPT | Mod: 25

## 2025-06-05 PROCEDURE — 97597 DBRDMT OPN WND 1ST 20 CM/<: CPT

## 2025-06-26 ENCOUNTER — APPOINTMENT (OUTPATIENT)
Dept: PODIATRY | Facility: CLINIC | Age: 50
End: 2025-06-26
Payer: COMMERCIAL

## 2025-06-26 PROCEDURE — 99213 OFFICE O/P EST LOW 20 MIN: CPT | Mod: 25

## 2025-06-26 PROCEDURE — 11721 DEBRIDE NAIL 6 OR MORE: CPT | Mod: 59

## 2025-06-26 PROCEDURE — 11057 PARNG/CUTG B9 HYPRKR LES >4: CPT

## 2025-07-17 ENCOUNTER — APPOINTMENT (OUTPATIENT)
Dept: PODIATRY | Facility: CLINIC | Age: 50
End: 2025-07-17
Payer: COMMERCIAL

## 2025-07-17 PROCEDURE — 11042 DBRDMT SUBQ TIS 1ST 20SQCM/<: CPT

## 2025-07-17 PROCEDURE — 99213 OFFICE O/P EST LOW 20 MIN: CPT | Mod: 25

## 2025-07-18 RX ORDER — SULFAMETHOXAZOLE AND TRIMETHOPRIM 800; 160 MG/1; MG/1
800-160 TABLET ORAL TWICE DAILY
Qty: 20 | Refills: 1 | Status: ACTIVE | COMMUNITY
Start: 2025-07-18 | End: 1900-01-01

## 2025-07-22 ENCOUNTER — APPOINTMENT (OUTPATIENT)
Dept: PODIATRY | Facility: CLINIC | Age: 50
End: 2025-07-22
Payer: COMMERCIAL

## 2025-07-22 DIAGNOSIS — L97.522 NON-PRESSURE CHRONIC ULCER OF OTHER PART OF LEFT FOOT WITH FAT LAYER EXPOSED: ICD-10-CM

## 2025-07-22 PROCEDURE — 99213 OFFICE O/P EST LOW 20 MIN: CPT

## 2025-07-23 LAB — BACTERIA TISS CULT: ABNORMAL

## 2025-07-31 ENCOUNTER — APPOINTMENT (OUTPATIENT)
Dept: PODIATRY | Facility: CLINIC | Age: 50
End: 2025-07-31
Payer: COMMERCIAL

## 2025-07-31 DIAGNOSIS — L97.509 TYPE 2 DIABETES MELLITUS WITH FOOT ULCER: ICD-10-CM

## 2025-07-31 DIAGNOSIS — E11.42 TYPE 2 DIABETES MELLITUS WITH DIABETIC POLYNEUROPATHY: ICD-10-CM

## 2025-07-31 DIAGNOSIS — E11.621 TYPE 2 DIABETES MELLITUS WITH FOOT ULCER: ICD-10-CM

## 2025-07-31 PROCEDURE — 99213 OFFICE O/P EST LOW 20 MIN: CPT

## 2025-09-03 ENCOUNTER — APPOINTMENT (OUTPATIENT)
Dept: PODIATRY | Facility: CLINIC | Age: 50
End: 2025-09-03
Payer: COMMERCIAL

## 2025-09-03 DIAGNOSIS — E11.42 TYPE 2 DIABETES MELLITUS WITH DIABETIC POLYNEUROPATHY: ICD-10-CM

## 2025-09-03 DIAGNOSIS — Z89.411 ACQUIRED ABSENCE OF RIGHT GREAT TOE: ICD-10-CM

## 2025-09-03 DIAGNOSIS — L85.1 ACQUIRED KERATOSIS [KERATODERMA] PALMARIS ET PLANTARIS: ICD-10-CM

## 2025-09-03 DIAGNOSIS — B35.1 TINEA UNGUIUM: ICD-10-CM

## 2025-09-03 PROCEDURE — 11056 PARNG/CUTG B9 HYPRKR LES 2-4: CPT

## 2025-09-03 PROCEDURE — 11721 DEBRIDE NAIL 6 OR MORE: CPT | Mod: 59
